# Patient Record
Sex: FEMALE | Race: WHITE | NOT HISPANIC OR LATINO | Employment: FULL TIME | URBAN - METROPOLITAN AREA
[De-identification: names, ages, dates, MRNs, and addresses within clinical notes are randomized per-mention and may not be internally consistent; named-entity substitution may affect disease eponyms.]

---

## 2023-03-08 ENCOUNTER — TELEPHONE (OUTPATIENT)
Dept: PRIMARY CARE | Facility: CLINIC | Age: 68
End: 2023-03-08
Payer: MEDICARE

## 2023-03-08 DIAGNOSIS — J01.90 ACUTE SINUSITIS, RECURRENCE NOT SPECIFIED, UNSPECIFIED LOCATION: Primary | ICD-10-CM

## 2023-03-08 DIAGNOSIS — J40 BRONCHITIS: Primary | ICD-10-CM

## 2023-03-08 RX ORDER — DOXYCYCLINE 100 MG/1
100 CAPSULE ORAL 2 TIMES DAILY
Qty: 14 CAPSULE | Refills: 0 | Status: SHIPPED | OUTPATIENT
Start: 2023-03-08 | End: 2023-03-15

## 2023-03-08 RX ORDER — FLUTICASONE PROPIONATE 220 UG/1
2 AEROSOL, METERED RESPIRATORY (INHALATION)
Qty: 12 G | Refills: 5 | Status: SHIPPED | OUTPATIENT
Start: 2023-03-08 | End: 2023-10-24 | Stop reason: ALTCHOICE

## 2023-03-08 NOTE — TELEPHONE ENCOUNTER
Nessa continues to have a productive cough with copious mucus.  She continues to feel fatigued.  Her rust colored mucus cleared up with  Azithromycin x2.  She finished her Medrol Dosepak about 10 days ago.  She is feeling improved but not completely better.  She has a sinus component to it with painful teeth and persistent nasal drainage.  She is unable to tolerate Augmentin since her bowel resection.  We will call her in doxycycline for 7 days and Flovent inhaler.  She is to continue her albuterol inhaler as needed.  I have asked her to update me next week or sooner for any concerns.

## 2023-03-08 NOTE — TELEPHONE ENCOUNTER
Patient is better, not 100% though.  She still is having trouble breathing, coughing, exhausted.    Please call [phone]

## 2023-04-12 ENCOUNTER — TELEPHONE (OUTPATIENT)
Dept: PRIMARY CARE | Facility: CLINIC | Age: 68
End: 2023-04-12
Payer: MEDICARE

## 2023-04-12 NOTE — TELEPHONE ENCOUNTER
Spoke to Nessa and try to clarify her coughing history.  After her course of pneumonia after 2 courses of azithromycin and doxycycline her cough did resolve and she was feeling better.  She then traveled to visit her daughter and came back approximately 4 days ago and started coughing again.  She has been taking only albuterol twice a day without relief.  She has no history of asthma but does have a history of needing inhalers with infections.  She seems to be getting frequent bronchitis with travel.  I have asked her to start her Flovent inhaler twice a day for 2 weeks and be sure to rinse her mouth out with water to avoid thrush.  She can still use her albuterol inhaler.  If her bronchitis persists I have asked her to come into the office for a formal discussion about further management which would most likely include repeat chest x-ray, probable repeat antibiotics and  immunoglobulin studies

## 2023-04-12 NOTE — TELEPHONE ENCOUNTER
Patient calling regarding a cough x 10 weeks overall since Pneumonia. Patient was doing a lot better and cough started back up again.    Patient is concerned would like to discuss with you.    Please call [phone]

## 2023-04-25 LAB — C. DIFFICILE TOXIN, PCR: NOT DETECTED

## 2023-05-07 DIAGNOSIS — J20.9 ACUTE BRONCHITIS, UNSPECIFIED: ICD-10-CM

## 2023-05-07 RX ORDER — ALBUTEROL SULFATE 90 UG/1
AEROSOL, METERED RESPIRATORY (INHALATION)
Qty: 6.7 G | Refills: 0 | Status: SHIPPED | OUTPATIENT
Start: 2023-05-07 | End: 2023-05-27

## 2023-05-08 PROBLEM — N95.2 ATROPHIC VAGINITIS: Status: ACTIVE | Noted: 2023-05-08

## 2023-05-08 PROBLEM — K57.30 DIVERTICULOSIS OF COLON: Status: RESOLVED | Noted: 2023-05-08 | Resolved: 2023-05-08

## 2023-05-08 PROBLEM — E55.9 VITAMIN D DEFICIENCY: Status: RESOLVED | Noted: 2023-05-08 | Resolved: 2023-05-08

## 2023-05-08 PROBLEM — G43.909 MIGRAINE HEADACHE: Status: RESOLVED | Noted: 2023-05-08 | Resolved: 2023-05-08

## 2023-05-08 PROBLEM — J30.9 ALLERGIC RHINITIS: Status: ACTIVE | Noted: 2023-05-08

## 2023-05-08 PROBLEM — M47.27 OSTEOARTHRITIS OF LUMBOSACRAL SPINE WITH RADICULOPATHY: Status: RESOLVED | Noted: 2023-05-08 | Resolved: 2023-05-08

## 2023-05-08 PROBLEM — M51.369 DEGENERATIVE DISC DISEASE, LUMBAR: Status: ACTIVE | Noted: 2023-05-08

## 2023-05-08 PROBLEM — L03.113 CELLULITIS OF RIGHT ELBOW: Status: RESOLVED | Noted: 2023-05-08 | Resolved: 2023-05-08

## 2023-05-08 PROBLEM — M81.0 OSTEOPOROSIS: Status: ACTIVE | Noted: 2023-05-08

## 2023-05-08 PROBLEM — M51.36 DEGENERATIVE DISC DISEASE, LUMBAR: Status: ACTIVE | Noted: 2023-05-08

## 2023-05-08 PROBLEM — M54.81 OCCIPITAL NEURALGIA: Status: RESOLVED | Noted: 2023-05-08 | Resolved: 2023-05-08

## 2023-05-08 PROBLEM — G44.009 CLUSTER HEADACHE: Status: ACTIVE | Noted: 2023-05-08

## 2023-05-08 PROBLEM — B02.9 HERPES ZOSTER: Status: RESOLVED | Noted: 2023-05-08 | Resolved: 2023-05-08

## 2023-05-08 PROBLEM — N81.4 CYSTOCELE WITH UTERINE PROLAPSE: Status: RESOLVED | Noted: 2023-05-08 | Resolved: 2023-05-08

## 2023-05-08 PROBLEM — I83.813 VARICOSE VEINS OF BOTH LOWER EXTREMITIES WITH PAIN: Status: RESOLVED | Noted: 2023-05-08 | Resolved: 2023-05-08

## 2023-05-08 PROBLEM — R23.3 EASY BRUISING: Status: RESOLVED | Noted: 2023-05-08 | Resolved: 2023-05-08

## 2023-05-08 PROBLEM — J45.909 REACTIVE AIRWAY DISEASE (HHS-HCC): Status: ACTIVE | Noted: 2023-05-08

## 2023-05-08 PROBLEM — L30.9 DERMATITIS: Status: RESOLVED | Noted: 2023-05-08 | Resolved: 2023-05-08

## 2023-05-08 PROBLEM — K59.09 CHRONIC CONSTIPATION: Status: RESOLVED | Noted: 2023-05-08 | Resolved: 2023-05-08

## 2023-05-08 PROBLEM — R05.3 CHRONIC COUGH: Status: ACTIVE | Noted: 2023-05-08

## 2023-05-08 PROBLEM — K58.9 IRRITABLE BOWEL SYNDROME: Status: ACTIVE | Noted: 2023-05-08

## 2023-05-08 PROBLEM — M16.11 PRIMARY OSTEOARTHRITIS OF RIGHT HIP: Status: ACTIVE | Noted: 2023-05-08

## 2023-05-08 PROBLEM — E78.5 HYPERLIPIDEMIA: Status: ACTIVE | Noted: 2023-05-08

## 2023-05-08 PROBLEM — M26.622 ARTHRALGIA OF LEFT TEMPOROMANDIBULAR JOINT: Status: ACTIVE | Noted: 2023-05-08

## 2023-05-08 PROBLEM — M26.629 TMJ ARTHRALGIA: Status: ACTIVE | Noted: 2023-05-08

## 2023-05-08 RX ORDER — VERAPAMIL HYDROCHLORIDE 120 MG/1
TABLET, FILM COATED, EXTENDED RELEASE ORAL
COMMUNITY

## 2023-05-08 RX ORDER — ALENDRONATE SODIUM 70 MG/1
70 TABLET ORAL
COMMUNITY
Start: 2022-10-21 | End: 2023-05-09 | Stop reason: ALTCHOICE

## 2023-05-08 RX ORDER — CHOLECALCIFEROL (VITAMIN D3) 50 MCG
2000 TABLET ORAL DAILY
COMMUNITY
Start: 2017-05-25

## 2023-05-08 RX ORDER — ESTRADIOL 0.1 MG/G
CREAM VAGINAL
COMMUNITY
End: 2023-10-24 | Stop reason: SDUPTHER

## 2023-05-08 RX ORDER — PEDI MULTIVIT NO.25/FOLIC ACID 300 MCG
TABLET,CHEWABLE ORAL
COMMUNITY
End: 2023-10-24 | Stop reason: ALTCHOICE

## 2023-05-08 RX ORDER — TRIAMCINOLONE ACETONIDE 1 MG/G
CREAM TOPICAL
COMMUNITY
Start: 2023-02-21

## 2023-05-08 RX ORDER — DOCUSATE SODIUM 100 MG/1
CAPSULE, LIQUID FILLED ORAL 2 TIMES DAILY
COMMUNITY
End: 2023-10-24 | Stop reason: SDUPTHER

## 2023-05-08 RX ORDER — ELECTROLYTES/DEXTROSE
1 SOLUTION, ORAL ORAL DAILY
COMMUNITY
Start: 2016-06-13

## 2023-05-08 RX ORDER — RIZATRIPTAN BENZOATE 10 MG/1
TABLET ORAL
COMMUNITY
Start: 2022-08-09 | End: 2023-10-21 | Stop reason: SDUPTHER

## 2023-05-09 ENCOUNTER — OFFICE VISIT (OUTPATIENT)
Dept: PRIMARY CARE | Facility: CLINIC | Age: 68
End: 2023-05-09
Payer: MEDICARE

## 2023-05-09 VITALS
TEMPERATURE: 97.6 F | SYSTOLIC BLOOD PRESSURE: 107 MMHG | OXYGEN SATURATION: 96 % | DIASTOLIC BLOOD PRESSURE: 60 MMHG | HEART RATE: 87 BPM

## 2023-05-09 DIAGNOSIS — R05.3 CHRONIC COUGH: Primary | ICD-10-CM

## 2023-05-09 PROCEDURE — 1159F MED LIST DOCD IN RCRD: CPT | Performed by: INTERNAL MEDICINE

## 2023-05-09 PROCEDURE — 1036F TOBACCO NON-USER: CPT | Performed by: INTERNAL MEDICINE

## 2023-05-09 PROCEDURE — 99214 OFFICE O/P EST MOD 30 MIN: CPT | Performed by: INTERNAL MEDICINE

## 2023-05-09 RX ORDER — MOXIFLOXACIN 5 MG/ML
1 SOLUTION/ DROPS OPHTHALMIC
COMMUNITY
End: 2023-10-24 | Stop reason: SDUPTHER

## 2023-05-09 RX ORDER — NEOMYCIN SULFATE, POLYMYXIN B SULFATE, HYDROCORTISONE 3.5; 10000; 1 MG/ML; [USP'U]/ML; MG/ML
SOLUTION/ DROPS AURICULAR (OTIC)
COMMUNITY
Start: 2019-05-15 | End: 2023-08-28 | Stop reason: SDUPTHER

## 2023-05-09 RX ORDER — HYDROCORTISONE 25 MG/G
CREAM TOPICAL AS NEEDED
COMMUNITY
Start: 2020-02-05

## 2023-05-09 RX ORDER — NYSTATIN 100000 U/G
OINTMENT TOPICAL
COMMUNITY
Start: 2023-01-09

## 2023-05-09 RX ORDER — ACETAMINOPHEN 325 MG/1
325 TABLET ORAL EVERY 4 HOURS PRN
COMMUNITY
Start: 2017-07-13

## 2023-05-09 RX ORDER — TOBRAMYCIN AND DEXAMETHASONE 3; 1 MG/G; MG/G
OINTMENT OPHTHALMIC
COMMUNITY
End: 2023-10-24 | Stop reason: ALTCHOICE

## 2023-05-09 RX ORDER — BUPROPION HYDROCHLORIDE 75 MG/1
TABLET ORAL
COMMUNITY
Start: 2022-08-30 | End: 2023-05-09 | Stop reason: ALTCHOICE

## 2023-05-09 NOTE — PROGRESS NOTES
"Nessa Valle is a 67 y.o. female who presents Follow-up (cough)    HPI: Nessa presents to discuss her ongoing breathing concerns since her pneumonia in February.  She is still having a chronic cough and sense of chest tightness with congestion.  She is still having some production of phlegm. Feels that it is \"hard to breath\", \"very crackly in lungs\".  She has been on multiple courses of antibiotics this winter for pneumonia and subsequent reactive airways.  She did use the Flovent and albuterol and felt that it did help.  She travels often and  is around her grandchildren who are often sick and she seems to get sick as well frequently.  She continues to be tired. She is Worried about her lungs.  She was a smoker in college.  Quit at age 23.  Mother and sister always cough.   Her sister does have a lot of rheumatologic issues and has lots of criteria for Crest and SLE    She was Having a lot of cramping and diarrhea and would get stool leakage after her multiple antibiotics which is now resolved.  Also with her excessive coughing her vaginal prolapse has worsened.  She has to use her pessary constantly now.     Not taking anything for acid reflux   Coughing worse at night.  Worse with lying down.    2018 had endoscopy.    All the reflux medicines got  her sick in the past.         Patient Active Problem List   Diagnosis    Allergic rhinitis    Atrophic vaginitis    Irritable bowel syndrome    Chronic cough    Cluster headache    Degenerative disc disease, lumbar    TMJ arthralgia    Reactive airway disease    Primary osteoarthritis of right hip    Osteoporosis    Hyperlipidemia        Past Medical History:   Diagnosis Date    Atypical chest pain     Cellulitis of right upper limb     Chronic constipation     Cystocele with uterine prolapse     Diverticulitis     Diverticulosis of colon     Foot fracture     Hematuria     Herpes zoster     History of ITP     Nonscarring hair loss, unspecified     Osteoarthritis of " lumbosacral spine with radiculopathy     SCC (squamous cell carcinoma)     Sinusitis     SVT (supraventricular tachycardia) (CMS/HCC)     Varicose veins of both lower extremities with pain     Vitamin D deficiency     Zoster         Past Surgical History:   Procedure Laterality Date    COLECTOMY PARTIAL / TOTAL  09/19/2017    Partial Colectomy Sigmoid    HAND SURGERY      HERNIA REPAIR      Inguinal hernia repair    HYSTERECTOMY      OOPHORECTOMY      SKIN CANCER EXCISION      Mohs Micrographic Surgery Arm Right    UMBILICAL HERNIA REPAIR  04/04/2014        Social History     Tobacco Use    Smoking status: Former     Types: Cigarettes    Smokeless tobacco: Never   Substance Use Topics    Alcohol use: Not Currently    Drug use: Never         Current Outpatient Medications:     acetaminophen (Tylenol) 325 mg tablet, Take 1 tablet (325 mg) by mouth every 4 hours if needed., Disp: , Rfl:     albuterol 90 mcg/actuation inhaler, INHALE 2 PUFFS EVERY 4 TO 6 HOURS AS NEEDED FOR CHEST TIGHTNESS AND COUGH, Disp: 6.7 g, Rfl: 0    biotin 5 mg capsule, Take 1 capsule (5 mg) by mouth once daily., Disp: , Rfl:     Calan  mg ER tablet, TAKE 1-2 TABLET ORALLY ONCE A DAY 90 DAYS, Disp: , Rfl:     cholecalciferol (Vitamin D-3) 50 MCG (2000 UT) tablet, Take 1 tablet (2,000 Units) by mouth once daily., Disp: , Rfl:     docusate sodium (Colace) 100 mg capsule, Take by mouth twice a day., Disp: , Rfl:     Estrace 0.01 % (0.1 mg/gram) vaginal cream, INSERT 1 GRAM INTRAVAGINALLY TWICE A WEEK, Disp: , Rfl:     fluticasone (Flovent) 220 mcg/actuation inhaler, Inhale 2 puffs  in the morning and 2 puffs before bedtime. Rinse mouth with water after use to reduce aftertaste and incidence of candidiasis. Do not swallow.., Disp: 12 g, Rfl: 5    hydrocortisone 2.5 % cream, if needed., Disp: , Rfl:     moxifloxacin (Vigamox) 0.5 % ophthalmic solution, Administer 1 drop into both eyes., Disp: , Rfl:     neomycin-polymyxin-HC (Cortisporin) otic  solution, PLACE IN EAR CANAL THREE TIMES A DAY FOR 3-4 DAYS, Disp: , Rfl:     nystatin (Mycostatin) ointment, Apply topically., Disp: , Rfl:     pedi multivit no.25-folic acid (Flintstones Multivitamin) 300 mcg tablet,chewable, Chew., Disp: , Rfl:     rizatriptan (Maxalt) 10 mg tablet, PLEASE SEE ATTACHED FOR DETAILED DIRECTIONS, Disp: , Rfl:     tobramycin-dexamethasone (Tobradex) ophthalmic ointment, TobraDex 0.3 %-0.1 % eye ointment  USE 1 APPLICATION IN BOTH EYES DAILY AT BEDTIME FOR 7 DAYS., Disp: , Rfl:     triamcinolone (Kenalog) 0.1 % cream, APPLY 2-3 TIMES DAILY TO AFFECTED AREA ON YOUR BACK, Disp: , Rfl:     inhalational spacing device inhaler, Use as directed with inhalers, Disp: 1 each, Rfl: 0     Allergies   Allergen Reactions    Banana Hives    Cantaloupe Hives    Orange Hives    Aluminum Hives    Esomeprazole Magnesium Unknown     Cluster migraines, constipation, dizziness    Iodine Other    Lactose Hives     lactose intolerant    Levofloxacin Other    Moxifloxacin Other    Sulfa (Sulfonamide Antibiotics) Other       Review of Systems  no swelling  in her legs, slightly sob with climbing stairs, does not feel she is having GERD     /60 (BP Location: Left arm, Patient Position: Sitting)   Pulse 87   Temp 36.4 °C (97.6 °F)   SpO2 96%  There is no height or weight on file to calculate BMI.   Physical Exam  Vitals reviewed.   Constitutional:       General: She is not in acute distress.     Appearance: Normal appearance. She is not ill-appearing or toxic-appearing.   HENT:      Right Ear: Tympanic membrane and external ear normal.      Left Ear: Tympanic membrane and external ear normal.      Mouth/Throat:      Mouth: Mucous membranes are moist.      Pharynx: No oropharyngeal exudate or posterior oropharyngeal erythema.   Eyes:      Conjunctiva/sclera: Conjunctivae normal.   Cardiovascular:      Rate and Rhythm: Normal rate and regular rhythm.      Heart sounds: No murmur heard.     No gallop.    Pulmonary:      Effort: Pulmonary effort is normal. No respiratory distress.      Breath sounds: Normal breath sounds. No wheezing, rhonchi or rales.   Musculoskeletal:         General: Normal range of motion.   Lymphadenopathy:      Head:      Right side of head: No submandibular adenopathy.      Left side of head: No submandibular adenopathy.      Cervical: No cervical adenopathy.   Skin:     Findings: No rash.   Neurological:      General: No focal deficit present.      Mental Status: She is alert.           Problem List Items Addressed This Visit       Chronic cough - Primary     Leading Differential includes chronic bronchitis, asthma, GERD   I do not feel she has any bacterial infection at this point and has been on multiple courses of antibiotics in the past few months.          Relevant Medications    inhalational spacing device inhaler    Other Relevant Orders    CT chest wo IV contrast    Referral to Pulmonology    Pulmonary function testing    CBC and Auto Differential    Immunoglobulins, IgG, IgA, IgM    Comprehensive metabolic panel       Nia Fletcher MD    TVT of 30 minutes with greater than 50% spent FTF in assessment/discussion and care coordination

## 2023-05-09 NOTE — PATIENT INSTRUCTIONS
Will obtain a CT scan of your lungs and pulmonary function tests   Labs ordered to check your blood counts and immune systems   Your coughing may also be related to acid reflux   Trial of cutting the Verapamil in half to see if you feel better.  More energy, less reflux   Re-challenge yourself with Pepcid    Return in 4 to 6 weeks

## 2023-05-10 NOTE — ASSESSMENT & PLAN NOTE
Leading Differential includes chronic bronchitis, asthma, GERD   I do not feel she has any bacterial infection at this point and has been on multiple courses of antibiotics in the past few months.

## 2023-05-27 DIAGNOSIS — J20.9 ACUTE BRONCHITIS, UNSPECIFIED: ICD-10-CM

## 2023-05-27 RX ORDER — ALBUTEROL SULFATE 90 UG/1
AEROSOL, METERED RESPIRATORY (INHALATION)
Qty: 8 G | Refills: 3 | Status: SHIPPED | OUTPATIENT
Start: 2023-05-27 | End: 2023-10-24 | Stop reason: ALTCHOICE

## 2023-05-30 NOTE — PROGRESS NOTES
Discussed results with Nessa.  Will repeat chest CT in one year. Counseled her that I  not feel her CT explains her symptoms   She does have a pulmonary appointment with Dr. Shen tomorrow and PFTs scheduled in the future.

## 2023-06-02 LAB
BASOPHILS (10*3/UL) IN BLOOD BY AUTOMATED COUNT: 0.05 X10E9/L (ref 0–0.1)
BASOPHILS/100 LEUKOCYTES IN BLOOD BY AUTOMATED COUNT: 0.9 % (ref 0–2)
EOSINOPHILS (10*3/UL) IN BLOOD BY AUTOMATED COUNT: 0.18 X10E9/L (ref 0–0.7)
EOSINOPHILS/100 LEUKOCYTES IN BLOOD BY AUTOMATED COUNT: 3.2 % (ref 0–6)
ERYTHROCYTE DISTRIBUTION WIDTH (RATIO) BY AUTOMATED COUNT: 12.3 % (ref 11.5–14.5)
ERYTHROCYTE MEAN CORPUSCULAR HEMOGLOBIN CONCENTRATION (G/DL) BY AUTOMATED: 31.6 G/DL (ref 32–36)
ERYTHROCYTE MEAN CORPUSCULAR VOLUME (FL) BY AUTOMATED COUNT: 94 FL (ref 80–100)
ERYTHROCYTES (10*6/UL) IN BLOOD BY AUTOMATED COUNT: 4.43 X10E12/L (ref 4–5.2)
HEMATOCRIT (%) IN BLOOD BY AUTOMATED COUNT: 41.5 % (ref 36–46)
HEMOGLOBIN (G/DL) IN BLOOD: 13.1 G/DL (ref 12–16)
IGA (MG/DL) IN SER/PLAS: 187 MG/DL (ref 70–400)
IGG (MG/DL) IN SER/PLAS: 1090 MG/DL (ref 700–1600)
IGM (MG/DL) IN SER/PLAS: 75 MG/DL (ref 40–230)
IMMATURE GRANULOCYTES/100 LEUKOCYTES IN BLOOD BY AUTOMATED COUNT: 0 % (ref 0–0.9)
LEUKOCYTES (10*3/UL) IN BLOOD BY AUTOMATED COUNT: 5.6 X10E9/L (ref 4.4–11.3)
LYMPHOCYTES (10*3/UL) IN BLOOD BY AUTOMATED COUNT: 2.05 X10E9/L (ref 1.2–4.8)
LYMPHOCYTES/100 LEUKOCYTES IN BLOOD BY AUTOMATED COUNT: 36.7 % (ref 13–44)
MONOCYTES (10*3/UL) IN BLOOD BY AUTOMATED COUNT: 0.39 X10E9/L (ref 0.1–1)
MONOCYTES/100 LEUKOCYTES IN BLOOD BY AUTOMATED COUNT: 7 % (ref 2–10)
NEUTROPHILS (10*3/UL) IN BLOOD BY AUTOMATED COUNT: 2.91 X10E9/L (ref 1.2–7.7)
NEUTROPHILS/100 LEUKOCYTES IN BLOOD BY AUTOMATED COUNT: 52.2 % (ref 40–80)
NRBC (PER 100 WBCS) BY AUTOMATED COUNT: 0 /100 WBC (ref 0–0)
PLATELETS (10*3/UL) IN BLOOD AUTOMATED COUNT: 239 X10E9/L (ref 150–450)

## 2023-06-04 LAB
ALLERGEN ANIMAL: CAT DANDER IGE (KU/L): <0.1 KU/L
ALLERGEN ANIMAL: DOG DANDER IGE (KU/L): <0.1 KU/L
ALLERGEN GRASS: BERMUDA GRASS (CYNODON DACTYLON) IGE (KU/L): <0.1 KU/L
ALLERGEN GRASS: JOHNSON GRASS (SORGHUM HALEPENSE) IGE (KU/L): <0.1 KU/L
ALLERGEN GRASS: MEADOW GRASS, KENTUCKY BLUE (POA PRATENSIS )IGE (KU/L): 0.85 KU/L
ALLERGEN GRASS: TIMOTHY GRASS (PHLEUM PRATENSE) IGE (KU/L): 0.64 KU/L
ALLERGEN INSECT: COCKROACH IGE: <0.1 KU/L
ALLERGEN MICROORGANISM: ALTERNARIA ALTERNATA IGE (KU/L): <0.1 KU/L
ALLERGEN MICROORGANISM: ASPERGILLUS FUMIGATUS IGE (KU/L): <0.1 KU/L
ALLERGEN MICROORGANISM: CLADOSPORIUM HERBARUM IGE (KU/L): <0.1 KU/L
ALLERGEN MICROORGANISM: PENICILLIUM CHRYSOGENUM (P. NOTATUM) IGE (KU/L): <0.1 KU/L
ALLERGEN MITE: DERMATOPHAGOIDES FARINAE (HOUSE DUST MITE) IGE (KU/L): <0.1 KU/L
ALLERGEN MITE: DERMATOPHAGOIDES PTERONYSSINUS (HOUSE DUST MITE) IGE (KU/L): <0.1 KU/L
ALLERGEN TREE: BOX-ELDER (ACER NEGUNDO) IGE (KU/L): <0.1 KU/L
ALLERGEN TREE: COMMON SILVER BIRCH (BETULA VERRUCOSA) IGE (KU/L): <0.1 KU/L
ALLERGEN TREE: COTTONWOOD (POPULUS DELTOIDES) IGE (KU/L): <0.1 KU/L
ALLERGEN TREE: ELM (ULMUS AMERICANA) IGE (KU/L): <0.1 KU/L
ALLERGEN TREE: MAPLE LEAF SYCAMORE, LONDON PLANE IGE (KU/L): <0.1 KU/L
ALLERGEN TREE: MOUNTAIN JUNIPER (JUNIPERUS SABINOIDES) IGE (KU/L): <0.1 KU/L
ALLERGEN TREE: MULBERRY (MORUS ALBA) IGE (KU/L): <0.1 KU/L
ALLERGEN TREE: OAK (QUERCUS ALBA) IGE (KU/L): <0.1 KU/L
ALLERGEN TREE: PECAN, HICKORY (CARYA PECAN) IGE (KU/L): 0.12 KU/L
ALLERGEN TREE: WALNUT IGE: 0.11 KU/L
ALLERGEN TREE: WHITE ASH (FRAXINUS AMERICANA) IGE (KU/L): <0.1 KU/L
ALLERGEN WEED: COMMON PIGWEED (AMARANTHUS RETROFLEXUS) IGE (KU/L): <0.1 KU/L
ALLERGEN WEED: COMMON RAGWEED (AMB. ARTEMISIIFOLIA/A. ELATIOR) IGE (KU/L): <0.1 KU/L
ALLERGEN WEED: GOOSEFOOT, LAMB'S QUARTERS (CHENOPODIUM ALBUM) IGE (KU/L): <0.1 KU/L
ALLERGEN WEED: PLANTAIN (ENGLISH), RIBWORT (PLANTAGO LANCEOLATA) IGE (KU/L): <0.1 KU/L
ALLERGEN WEED: PRICKLY SALTWORT/RUSSIAN THISTLE (SALSOLA KALI) IGE (KU/L): <0.1 KU/L
ALLERGEN WEED: SHEEP SORREL (RUMEX ACETOSELLA) IGE (KU/L): <0.1 KU/L
IMMUNOCAP IGE: 6.7 KU/L (ref 0–214)
IMMUNOCAP INTERPRETATION: ABNORMAL

## 2023-07-07 ENCOUNTER — LAB (OUTPATIENT)
Dept: LAB | Facility: LAB | Age: 68
End: 2023-07-07
Payer: MEDICARE

## 2023-07-07 DIAGNOSIS — R35.0 URINARY FREQUENCY: ICD-10-CM

## 2023-07-07 LAB
APPEARANCE, URINE: CLEAR
BACTERIA, URINE: ABNORMAL /HPF
BILIRUBIN, URINE: NEGATIVE
BLOOD, URINE: NEGATIVE
COLOR, URINE: ABNORMAL
GLUCOSE, URINE: NEGATIVE MG/DL
KETONES, URINE: NEGATIVE MG/DL
LEUKOCYTE ESTERASE, URINE: ABNORMAL
NITRITE, URINE: NEGATIVE
PH, URINE: 5 (ref 5–8)
PROTEIN, URINE: NEGATIVE MG/DL
RBC, URINE: 1 /HPF (ref 0–5)
SPECIFIC GRAVITY, URINE: 1 (ref 1–1.03)
SQUAMOUS EPITHELIAL CELLS, URINE: 1 /HPF
UROBILINOGEN, URINE: <2 MG/DL (ref 0–1.9)
WBC, URINE: 11 /HPF (ref 0–5)

## 2023-07-07 PROCEDURE — 87086 URINE CULTURE/COLONY COUNT: CPT

## 2023-07-07 PROCEDURE — 87186 SC STD MICRODIL/AGAR DIL: CPT

## 2023-07-07 PROCEDURE — 81001 URINALYSIS AUTO W/SCOPE: CPT

## 2023-07-07 PROCEDURE — 87077 CULTURE AEROBIC IDENTIFY: CPT

## 2023-07-08 ENCOUNTER — TELEPHONE (OUTPATIENT)
Dept: PRIMARY CARE | Facility: CLINIC | Age: 68
End: 2023-07-08
Payer: MEDICARE

## 2023-07-08 DIAGNOSIS — R39.9 URINARY TRACT INFECTION SYMPTOMS: Primary | ICD-10-CM

## 2023-07-08 RX ORDER — CEPHALEXIN 500 MG/1
500 CAPSULE ORAL 3 TIMES DAILY
Qty: 21 CAPSULE | Refills: 0 | Status: SHIPPED | OUTPATIENT
Start: 2023-07-08 | End: 2023-07-15

## 2023-07-08 NOTE — TELEPHONE ENCOUNTER
Called today after seeing her urinalysis. She reports that she is symptomatic with urinary frequency and dysuria.  She would like to start an antibiotic empirically while her culture is pending. I did  her that it is not clear from the urinalysis is her symptoms are from infection or other issue at this point and the culture will help clarify.

## 2023-07-09 LAB — URINE CULTURE: ABNORMAL

## 2023-07-17 ENCOUNTER — TELEPHONE (OUTPATIENT)
Dept: PRIMARY CARE | Facility: CLINIC | Age: 68
End: 2023-07-17
Payer: MEDICARE

## 2023-07-17 DIAGNOSIS — R39.9 URINARY TRACT INFECTION SYMPTOMS: Primary | ICD-10-CM

## 2023-07-17 NOTE — TELEPHONE ENCOUNTER
Medication: lantus    PCP: Harriet Selby MD  Preferred Contact Number:     mobile [phone]         Pharmacy:  n/a    Patient would like a call back.     Patient complete Keflex for UTI, still has pressure, difficulty urinating, slight pain.  She would like to know does she need to give another urine sample or what are your recommendations.    Please call [phone]

## 2023-07-18 ENCOUNTER — LAB (OUTPATIENT)
Dept: LAB | Facility: LAB | Age: 68
End: 2023-07-18
Payer: MEDICARE

## 2023-07-18 DIAGNOSIS — R39.9 URINARY TRACT INFECTION SYMPTOMS: ICD-10-CM

## 2023-07-18 LAB
APPEARANCE, URINE: CLEAR
BILIRUBIN, URINE: NEGATIVE
BLOOD, URINE: NEGATIVE
COLOR, URINE: NORMAL
GLUCOSE, URINE: NEGATIVE MG/DL
KETONES, URINE: NEGATIVE MG/DL
LEUKOCYTE ESTERASE, URINE: NEGATIVE
NITRITE, URINE: NEGATIVE
PH, URINE: 5 (ref 5–8)
PROTEIN, URINE: NEGATIVE MG/DL
SPECIFIC GRAVITY, URINE: 1.01 (ref 1–1.03)
UROBILINOGEN, URINE: <2 MG/DL (ref 0–1.9)

## 2023-07-18 PROCEDURE — 81003 URINALYSIS AUTO W/O SCOPE: CPT

## 2023-08-02 ENCOUNTER — TELEPHONE (OUTPATIENT)
Dept: PRIMARY CARE | Facility: CLINIC | Age: 68
End: 2023-08-02
Payer: MEDICARE

## 2023-08-02 DIAGNOSIS — R10.2 SUPRAPUBIC PAIN: Primary | ICD-10-CM

## 2023-08-02 NOTE — TELEPHONE ENCOUNTER
Still feeling a pressure worse at night.  Has some dysuria.    Having a hard time urinating.  She has had these episodes before.   She does feel her bladder has dropped. She has a constant pain.   She does have a rectocele and a pessary which has helped before.  We will recheck a urinalysis and urine culture first. If negative will refer on to Dr. Villatoro for further evaluation and management

## 2023-08-08 ENCOUNTER — LAB (OUTPATIENT)
Dept: LAB | Facility: LAB | Age: 68
End: 2023-08-08
Payer: MEDICARE

## 2023-08-08 DIAGNOSIS — R10.2 SUPRAPUBIC PAIN: ICD-10-CM

## 2023-08-08 LAB
APPEARANCE, URINE: CLEAR
BILIRUBIN, URINE: NEGATIVE
BLOOD, URINE: NEGATIVE
COLOR, URINE: YELLOW
GLUCOSE, URINE: NEGATIVE MG/DL
KETONES, URINE: NEGATIVE MG/DL
LEUKOCYTE ESTERASE, URINE: NEGATIVE
NITRITE, URINE: NEGATIVE
PH, URINE: 5 (ref 5–8)
PROTEIN, URINE: NEGATIVE MG/DL
SPECIFIC GRAVITY, URINE: 1.01 (ref 1–1.03)
UROBILINOGEN, URINE: <2 MG/DL (ref 0–1.9)

## 2023-08-08 PROCEDURE — 87086 URINE CULTURE/COLONY COUNT: CPT

## 2023-08-08 PROCEDURE — 81003 URINALYSIS AUTO W/O SCOPE: CPT

## 2023-08-09 DIAGNOSIS — N81.4 CYSTOCELE WITH PROLAPSE: Primary | ICD-10-CM

## 2023-08-09 LAB — URINE CULTURE: NORMAL

## 2023-08-28 DIAGNOSIS — H60.90 OTITIS EXTERNA, UNSPECIFIED CHRONICITY, UNSPECIFIED LATERALITY, UNSPECIFIED TYPE: Primary | ICD-10-CM

## 2023-08-28 RX ORDER — NEOMYCIN SULFATE, POLYMYXIN B SULFATE, HYDROCORTISONE 3.5; 10000; 1 MG/ML; [USP'U]/ML; MG/ML
3 SOLUTION/ DROPS AURICULAR (OTIC) 3 TIMES DAILY
Qty: 10 ML | Refills: 0 | Status: SHIPPED | OUTPATIENT
Start: 2023-08-28

## 2023-08-28 NOTE — TELEPHONE ENCOUNTER
Patient is going on a trip that will require lots of water.  She had an old ear drops medication from previous doctor.  She wants to know if you can refill to take with her?    Please advise  I can call back  [phone]    MED- neomycin/polymycin optic solution

## 2023-09-27 DIAGNOSIS — Z00.00 HEALTH MAINTENANCE EXAMINATION: ICD-10-CM

## 2023-10-21 PROBLEM — R30.0 DYSURIA: Status: ACTIVE | Noted: 2023-10-21

## 2023-10-21 PROBLEM — B99.9 RECURRENT INFECTIONS: Status: ACTIVE | Noted: 2023-10-21

## 2023-10-21 PROBLEM — M25.871 IMPINGEMENT SYNDROME OF RIGHT ANKLE: Status: ACTIVE | Noted: 2020-02-26

## 2023-10-21 PROBLEM — K21.00 GASTROESOPHAGEAL REFLUX DISEASE WITH ESOPHAGITIS: Status: ACTIVE | Noted: 2018-02-07

## 2023-10-21 PROBLEM — D37.032 NEOPLASM OF UNCERTAIN BEHAVIOR OF SUBMANDIBULAR GLAND: Status: ACTIVE | Noted: 2022-10-06

## 2023-10-21 PROBLEM — K11.5 SUBMANDIBULAR SIALOLITHIASIS: Status: ACTIVE | Noted: 2022-10-06

## 2023-10-21 PROBLEM — R53.83 MALAISE AND FATIGUE: Status: ACTIVE | Noted: 2019-02-01

## 2023-10-21 PROBLEM — M89.9 DISORDER OF BONE AND ARTICULAR CARTILAGE: Status: ACTIVE | Noted: 2019-02-01

## 2023-10-21 PROBLEM — M85.80 OSTEOPENIA: Status: ACTIVE | Noted: 2023-10-21

## 2023-10-21 PROBLEM — R10.32 CHRONIC LEFT LOWER QUADRANT PAIN: Status: ACTIVE | Noted: 2023-10-21

## 2023-10-21 PROBLEM — J31.2 CHRONIC SORE THROAT: Status: ACTIVE | Noted: 2023-10-21

## 2023-10-21 PROBLEM — R20.9 DISTURBANCE OF SKIN SENSATION: Status: ACTIVE | Noted: 2023-10-21

## 2023-10-21 PROBLEM — M62.89 PELVIC FLOOR DYSFUNCTION: Status: ACTIVE | Noted: 2023-10-21

## 2023-10-21 PROBLEM — M94.9 DISORDER OF BONE AND ARTICULAR CARTILAGE: Status: ACTIVE | Noted: 2019-02-01

## 2023-10-21 PROBLEM — K57.32 DIVERTICULITIS OF LARGE INTESTINE WITHOUT PERFORATION OR ABSCESS WITHOUT BLEEDING: Status: ACTIVE | Noted: 2017-05-11

## 2023-10-21 PROBLEM — G89.29 CHRONIC MIDLINE LOW BACK PAIN WITHOUT SCIATICA: Status: ACTIVE | Noted: 2018-10-29

## 2023-10-21 PROBLEM — G89.29 CHRONIC PAIN OF RIGHT ANKLE: Status: ACTIVE | Noted: 2019-09-25

## 2023-10-21 PROBLEM — R00.0 TACHYCARDIA: Status: ACTIVE | Noted: 2023-10-21

## 2023-10-21 PROBLEM — M85.89 OSTEOPENIA OF MULTIPLE SITES: Status: ACTIVE | Noted: 2018-10-29

## 2023-10-21 PROBLEM — K62.3 RECTAL PROLAPSE: Status: ACTIVE | Noted: 2023-10-21

## 2023-10-21 PROBLEM — H69.92 DYSFUNCTION OF LEFT EUSTACHIAN TUBE: Status: ACTIVE | Noted: 2023-10-21

## 2023-10-21 PROBLEM — R35.0 URINARY FREQUENCY: Status: ACTIVE | Noted: 2023-10-21

## 2023-10-21 PROBLEM — D22.9 NUMEROUS MOLES: Status: ACTIVE | Noted: 2023-10-21

## 2023-10-21 PROBLEM — G89.29 CHRONIC LEFT LOWER QUADRANT PAIN: Status: ACTIVE | Noted: 2023-10-21

## 2023-10-21 PROBLEM — M25.571 CHRONIC PAIN OF RIGHT ANKLE: Status: ACTIVE | Noted: 2019-09-25

## 2023-10-21 PROBLEM — K59.00 OBSTIPATION: Status: ACTIVE | Noted: 2018-02-07

## 2023-10-21 PROBLEM — M54.50 CHRONIC MIDLINE LOW BACK PAIN WITHOUT SCIATICA: Status: ACTIVE | Noted: 2018-10-29

## 2023-10-21 PROBLEM — M51.16 INTERVERTEBRAL DISC DISORDER WITH RADICULOPATHY OF LUMBAR REGION: Status: ACTIVE | Noted: 2020-09-18

## 2023-10-21 PROBLEM — K59.09 CHRONIC CONSTIPATION: Status: ACTIVE | Noted: 2023-05-08

## 2023-10-21 PROBLEM — R39.11 URINARY HESITANCY: Status: ACTIVE | Noted: 2023-10-21

## 2023-10-21 PROBLEM — R53.81 MALAISE AND FATIGUE: Status: ACTIVE | Noted: 2019-02-01

## 2023-10-21 PROBLEM — M21.6X1: Status: ACTIVE | Noted: 2020-02-26

## 2023-10-21 PROBLEM — I10 ESSENTIAL HYPERTENSION: Status: ACTIVE | Noted: 2019-02-01

## 2023-10-21 PROBLEM — I47.10 PAROXYSMAL SVT (SUPRAVENTRICULAR TACHYCARDIA) (CMS-HCC): Status: ACTIVE | Noted: 2017-06-28

## 2023-10-21 RX ORDER — DOCUSATE SODIUM 100 MG/1
100 CAPSULE, LIQUID FILLED ORAL
COMMUNITY
End: 2023-10-24 | Stop reason: SDUPTHER

## 2023-10-21 RX ORDER — HYDROCODONE BITARTRATE AND HOMATROPINE METHYLBROMIDE ORAL SOLUTION 5; 1.5 MG/5ML; MG/5ML
5-10 LIQUID ORAL NIGHTLY PRN
COMMUNITY
Start: 2023-02-06 | End: 2024-03-27 | Stop reason: ALTCHOICE

## 2023-10-21 RX ORDER — BENZONATATE 100 MG/1
100 CAPSULE ORAL 3 TIMES DAILY PRN
COMMUNITY
Start: 2023-02-06 | End: 2023-12-07

## 2023-10-21 RX ORDER — ALENDRONATE SODIUM 70 MG/1
70 TABLET ORAL
COMMUNITY
Start: 2022-10-21 | End: 2023-10-24

## 2023-10-21 RX ORDER — VALACYCLOVIR HYDROCHLORIDE 1 G/1
1 TABLET, FILM COATED ORAL 3 TIMES DAILY
COMMUNITY

## 2023-10-21 RX ORDER — RIZATRIPTAN BENZOATE 10 MG/1
10 TABLET ORAL ONCE AS NEEDED
COMMUNITY
Start: 2014-03-03

## 2023-10-21 RX ORDER — BUPROPION HYDROCHLORIDE 75 MG/1
1 TABLET ORAL 2 TIMES DAILY
COMMUNITY
End: 2023-10-24

## 2023-10-21 RX ORDER — DOCUSATE SODIUM 100 MG/1
1 CAPSULE, LIQUID FILLED ORAL 2 TIMES DAILY PRN
COMMUNITY

## 2023-10-21 RX ORDER — BUPROPION HYDROCHLORIDE 75 MG/1
1 TABLET ORAL 2 TIMES DAILY
COMMUNITY
Start: 2022-08-30 | End: 2023-10-24

## 2023-10-21 RX ORDER — MOXIFLOXACIN 5 MG/ML
1 SOLUTION/ DROPS OPHTHALMIC 3 TIMES DAILY
COMMUNITY
Start: 2021-01-24 | End: 2023-10-24 | Stop reason: ALTCHOICE

## 2023-10-21 RX ORDER — TRIPROLIDINE/PSEUDOEPHEDRINE 2.5MG-60MG
TABLET ORAL
COMMUNITY
End: 2024-03-27 | Stop reason: ALTCHOICE

## 2023-10-21 RX ORDER — ESTRADIOL 0.1 MG/G
2 CREAM VAGINAL
COMMUNITY
Start: 2012-10-05

## 2023-10-22 NOTE — PROGRESS NOTES
Physical Exam    Name Nessa Valle    Date of Service :10/24/2023    Clarify osteoporosis history -  any treatment.   Dr. Mine Forte.    She has met with Dr. Dai in the past .    Clarify allergy and coughing history.  Lots of environmental allergies, She still coughs   She smoked for 6 years.  Quit age 22   Reconcile medications      Nessa Valle is a 68 y.o. year old female who is being seen for a comprehensive exam  She feels well overall.  She has recovered from her last prolonged viral illness she had over the late spring early summer.    She does report sounds with her breathing like as crinkling.  She also has a chronic cough and does feel some chest tightness. She has had a negative PFTs without bronchodilator response.   This past year She has had multiple URIs especially after travelling to visit with her grandchildren.  She has consulted with Dr. Shen in Pulmonary who checked PFTs and immunoglobulins both which were normal. Her respiratory allergy panel was positive for grass.   She does have various environmental allergies including dust and mold.     She also established with Danita Haney last month for her uterine prolapse, cystocele, pelvic floor dysfunction as well as bowel straining and leakage. She was referred to pelvic floor therapy, TV estrogen, and continued use of Pessary.  Instructed to follow up in 3 months in December   GYN Surgical Hx:  - Hx of Hysterectomy, 1997 and prolapse repair [facility] w Dr. Driver  - Hx of Oophorectomy, 2002 ovary removal, Fallopian tube adhesions over bladder, bowel and endometriosis  w Dr. 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 Since then she has had problems with erratic bowel movements and recurrent episodes of lower abdominal pain..She will take Colace 2 to 3 after she gets home from work and  then eat dinner to stay regular. She will do this 3 to 4 days a week. She also had an episode of bacterial overgrowth in 2019. Most recent CT of abdomen and pelvis done August 29, 2022 was without any acute abnormalities. She has some narrowing at the anastomosis.      She also has cluster headaches and optical migraines. She has been on Calan SR for many years. She has tried to get off but her HAs come back full force. Botox helps and Maxalt as needed   Follows with Neurology at [facility].     Lesion of left lower eyelid recc. To be excised by ophthalmologist .  Her dermatologist felt it was not worrisome. She would like to get a second opinion before undergoing a biopsy.   She does have a history of SCC and BCC        Patient Active Problem List   Diagnosis    Allergic rhinitis    Postmenopausal atrophic vaginitis    Chronic constipation    Irritable bowel syndrome    Chronic cough    Cluster headache    Cystocele, midline    Degenerative disc disease, lumbar    TMJ dysfunction    Reactive airway disease    Primary osteoarthritis of right hip    Osteoporosis    Migraine with aura    Hyperlipidemia    Age-related osteoporosis without current pathological fracture    Ataxia    Bilateral fibrocystic breast changes    Breast cyst    Chronic left lower quadrant pain    Chronic midline low back pain without sciatica    Chronic pain of right ankle    Chronic sore throat    Disorder of bone and articular cartilage    Disturbance of skin sensation    Diverticulitis of large intestine without perforation or abscess without bleeding    Dysfunction of left eustachian tube    Dysuria    Essential hypertension    Female stress incontinence    Heterozygous MTHFR mutation [id number]    Impingement syndrome of right ankle    Inconclusive mammogram    Lactose intolerance    Malaise and fatigue    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    Numerous moles    Osteopenia of multiple sites    Osteopenia    Paroxysmal SVT  (supraventricular tachycardia)    Prolapse of vaginal vault after hysterectomy    Pronation deformity of ankle, acquired, right    Submandibular sialolithiasis    Tachycardia    Urinary frequency    Urinary hesitancy    Recurrent infections    Obstipation    Pelvic floor dysfunction    Rectal prolapse    Rectocele    Vertigo of central origin    Intervertebral disc disorder with radiculopathy of lumbar region    Gastroesophageal reflux disease with esophagitis    Annual physical exam    Multiple pulmonary nodules        Past Medical History:   Diagnosis Date    Atypical chest pain     Cellulitis of right upper limb     Chronic constipation     Cystocele with uterine prolapse     Diverticulitis     Diverticulosis of colon     Foot fracture     Hematuria     Herpes zoster     History of ITP     Nonscarring hair loss, unspecified     Osteoarthritis of lumbosacral spine with radiculopathy     SCC (squamous cell carcinoma)     Sinusitis     SVT (supraventricular tachycardia)     Varicose veins of both lower extremities with pain     Vitamin D deficiency     Zoster         Past Surgical History:   Procedure Laterality Date    COLECTOMY PARTIAL / TOTAL  09/19/2017    Partial Colectomy Sigmoid    HAND SURGERY      HERNIA REPAIR      Inguinal hernia repair    HYSTERECTOMY      OOPHORECTOMY      SKIN CANCER EXCISION      Mohs Micrographic Surgery Arm Right    UMBILICAL HERNIA REPAIR  04/04/2014        Social History     Tobacco Use    Smoking status: Former     Packs/day: 0.75     Years: 4.00     Additional pack years: 0.00     Total pack years: 3.00     Types: Cigarettes    Smokeless tobacco: Never   Substance Use Topics    Alcohol use: Not Currently    Drug use: Never      Social History     Social History Narrative     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 2 daughters Mariola  who is an artist and influencer, Huma is an actor and lives in [place]. works for [employer]. Mariola lives in [place].    four  grandsons.     Her diet: generally healthy    Tobacco-from age [age]yo 3/4 ppd     ETOH- NO    Exercise- nothing regular because of her pelvic prolapse and LLQ pain at site of anastomosis     Inconsistent walking due to chronic foot pain       Family History   Problem Relation Name Age of Onset    Ovarian cancer Mother          d.93    Osteoporosis Mother      Glaucoma Mother      Macular degeneration Mother      Hypertension Father          d. 58 of an accident    Other (CREST) Sister      Diabetes Sister      No Known Problems Brother      Stomach cancer Maternal Grandmother          d. 72    Stroke Maternal Grandfather          d. 72    COPD Maternal Grandfather      Heart failure Paternal Grandmother          d. 82    Diabetes Paternal Grandfather          d.64    Other (CHRONIC CONSTIPATION) Other FAMILY           Current Outpatient Medications:     acetaminophen (Tylenol) 325 mg tablet, Take 1 tablet (325 mg) by mouth every 4 hours if needed., Disp: , Rfl:     benzonatate (Tessalon) 100 mg capsule, Take 1 capsule (100 mg) by mouth 3 times a day as needed for cough., Disp: , Rfl:     biotin 5 mg capsule, Take 1 capsule (5 mg) by mouth once daily., Disp: , Rfl:     Calan  mg ER tablet, TAKE 1-2 TABLET ORALLY ONCE A DAY 90 DAYS, Disp: , Rfl:     cholecalciferol (Vitamin D-3) 50 MCG (2000 UT) tablet, Take 1 tablet (2,000 Units) by mouth once daily., Disp: , Rfl:     docusate sodium (Colace) 100 mg capsule, Take 1 capsule (100 mg) by mouth 2 times a day as needed for constipation., Disp: , Rfl:     estradiol (Estrace) 0.01 % (0.1 mg/gram) vaginal cream, Insert 2 g into the vagina 1 (one) time per week., Disp: , Rfl:     hydrocortisone 2.5 % cream, if needed., Disp: , Rfl:     ibuprofen 100 mg/5 mL suspension, Take by mouth., Disp: , Rfl:     inhalational spacing device inhaler, Use as directed with inhalers, Disp: 1 each, Rfl: 0    neomycin-polymyxin-HC (Cortisporin) otic solution, Administer 3 drops into  "each ear 3 times a day., Disp: 10 mL, Rfl: 0    nystatin (Mycostatin) ointment, Apply topically., Disp: , Rfl:     rizatriptan (Maxalt) 10 mg tablet, Take 1 tablet (10 mg) by mouth 1 time if needed for migraine. take 1 tablet by mouth AT ONSET OF HEADACHE may repeat in 2 hours IF headache PERSISTS maximum daily dose of 3, Disp: , Rfl:     triamcinolone (Kenalog) 0.1 % cream, APPLY 2-3 TIMES DAILY TO AFFECTED AREA ON YOUR BACK, Disp: , Rfl:     valACYclovir (Valtrex) 1 gram tablet, Take 1 tablet (1,000 mg) by mouth 3 times a day., Disp: , Rfl:     albuterol 90 mcg/actuation inhaler, Inhale 2 puffs every 6 hours if needed for wheezing or shortness of breath., Disp: 8 g, Rfl: 3    fluticasone (Flovent) 220 mcg/actuation inhaler, Inhale 2 puffs 2 times a day. Rinse mouth with water after use to reduce aftertaste and incidence of candidiasis. Do not swallow., Disp: 12 g, Rfl: 5    hydrocodone-homatropine 5-1.5 mg/5 mL syrup, Take 5-10 mL by mouth as needed at bedtime for cough., Disp: , Rfl:     Allergies   Allergen Reactions    Iodine Other, Anaphylaxis and Hives     Shrimp causes hives, facial swelling    Sulfa (Sulfonamide Antibiotics) Other, Hives, Rash and Swelling     hives, headache   swelling hands, face     hives, headache swelling hands, face    Banana Hives    Cantaloupe Hives    Orange Hives    Strawberry Hives    Aluminum Hives    Esomeprazole Other     Cluster migraines, constipation, dizziness    Esomeprazole Magnesium Unknown     Cluster migraines, constipation, dizziness    Lactose Hives     lactose intolerant    Levofloxacin Other and Unknown     Leg pain, numbness, tingling    Moxifloxacin Other and Hives     Leg pain, numbness, tingling    Ofloxacin Unknown    Shrimp Unknown    Tomato Other     sores in mouth       Review of Systems     /79 (BP Location: Left arm, Patient Position: Sitting)   Pulse 85   Temp 36.5 °C (97.7 °F)   Ht 1.626 m (5' 4\")   Wt 63.7 kg (140 lb 8 oz)   SpO2 95%   BMI " 24.12 kg/m²  Body mass index is 24.12 kg/m².    Physical Exam  Vitals reviewed.   Constitutional:       General: She is not in acute distress.     Appearance: Normal appearance.   HENT:      Right Ear: Tympanic membrane and external ear normal.      Left Ear: Tympanic membrane and external ear normal.      Mouth/Throat:      Mouth: Mucous membranes are moist.      Pharynx: No oropharyngeal exudate or posterior oropharyngeal erythema.   Eyes:      Extraocular Movements: Extraocular movements intact.      Pupils: Pupils are equal, round, and reactive to light.   Neck:      Vascular: No carotid bruit.   Cardiovascular:      Rate and Rhythm: Normal rate and regular rhythm.      Pulses: Normal pulses.      Heart sounds: Normal heart sounds. No murmur heard.     No gallop.   Pulmonary:      Effort: Pulmonary effort is normal. No respiratory distress.      Breath sounds: Wheezing (right upper lobe) present. No rhonchi or rales.   Abdominal:      General: Bowel sounds are normal. There is no distension.      Palpations: There is no mass.      Tenderness: There is no abdominal tenderness. There is no guarding.   Musculoskeletal:         General: No swelling or tenderness. Normal range of motion.      Right lower leg: No edema.      Left lower leg: No edema.   Lymphadenopathy:      Cervical: No cervical adenopathy.      Upper Body:      Right upper body: No supraclavicular or axillary adenopathy.      Left upper body: No supraclavicular or axillary adenopathy.      Lower Body: No right inguinal adenopathy. No left inguinal adenopathy.   Skin:     General: Skin is warm and dry.      Findings: No rash.   Neurological:      General: No focal deficit present.      Mental Status: She is alert.   Psychiatric:         Mood and Affect: Mood normal.         RESULTS/DATA:  Reviewed Standard Labs for this physical with patient ( any significant issues addressed in A/P )       Assessment/Plan   She will obtain her  fasting labs.    I  have ordered a  short term 6 month chest CT  to follow up for multiple pulmonary nodules and wheeze on exam with a history of coughing as well.  I would like her to Restart the flovent inhaler twice a day for 2 weeks and use her albuterol inhaler for immediate bronchodilator relive for a couple of weeks  and report back to me any improvement in her symptoms.  Her cough may also be related to her acid reflux. She is intolerant to PPIs so I would like her to Try Pepcid AC twice a day    She will Discuss treatment for her osteoporosis with her upcoming rheumatologist appointment. Currently she is not on any treatment because she did not want to start the Fosamax prescribed  She will use tylenol and Voltaren gel for her hand arthritis.      Problem List Items Addressed This Visit       Hyperlipidemia    Annual physical exam - Primary    Multiple pulmonary nodules    Relevant Orders    CT chest wo IV contrast     Other Visit Diagnoses       Bronchitis        Relevant Medications    fluticasone (Flovent) 220 mcg/actuation inhaler    albuterol 90 mcg/actuation inhaler    Acute bronchitis, unspecified        Relevant Medications    albuterol 90 mcg/actuation inhaler            ROUTINE:     Immunizations  current.      Mammogram: last completed 8/30/22  neg.  Follows with Freda Almendarez CNP last visit 8/14/23 mammogram done at that visit.  PAP: no longer indicated s/p hysterectomy/oopherectomy  COLONOSCOPY: 2019, repeat 5 years     DEXA:   7/12/22 Lowest T score -2.6 in right Hip  6/9/2020  lowest T score -2.5 in left hip     OPHTHALMOLOGY: current.    DERMATOLOGY   Dr. Escobar.   Current but does have a eye lesion currently that needs to be removed.       Nia Fletcher MD

## 2023-10-24 ENCOUNTER — OFFICE VISIT (OUTPATIENT)
Dept: PRIMARY CARE | Facility: CLINIC | Age: 68
End: 2023-10-24
Payer: MEDICARE

## 2023-10-24 VITALS
BODY MASS INDEX: 23.99 KG/M2 | TEMPERATURE: 97.7 F | SYSTOLIC BLOOD PRESSURE: 120 MMHG | WEIGHT: 140.5 LBS | DIASTOLIC BLOOD PRESSURE: 79 MMHG | OXYGEN SATURATION: 95 % | HEIGHT: 64 IN | HEART RATE: 85 BPM

## 2023-10-24 DIAGNOSIS — E78.5 HYPERLIPIDEMIA, UNSPECIFIED HYPERLIPIDEMIA TYPE: ICD-10-CM

## 2023-10-24 DIAGNOSIS — J20.9 ACUTE BRONCHITIS, UNSPECIFIED: ICD-10-CM

## 2023-10-24 DIAGNOSIS — Z00.00 ANNUAL PHYSICAL EXAM: Primary | ICD-10-CM

## 2023-10-24 DIAGNOSIS — R91.8 MULTIPLE PULMONARY NODULES: ICD-10-CM

## 2023-10-24 DIAGNOSIS — J40 BRONCHITIS: ICD-10-CM

## 2023-10-24 PROCEDURE — 1159F MED LIST DOCD IN RCRD: CPT | Performed by: INTERNAL MEDICINE

## 2023-10-24 PROCEDURE — 1126F AMNT PAIN NOTED NONE PRSNT: CPT | Performed by: INTERNAL MEDICINE

## 2023-10-24 PROCEDURE — 1036F TOBACCO NON-USER: CPT | Performed by: INTERNAL MEDICINE

## 2023-10-24 PROCEDURE — 1160F RVW MEDS BY RX/DR IN RCRD: CPT | Performed by: INTERNAL MEDICINE

## 2023-10-24 PROCEDURE — UHSPHYS PR UH SELECT PHYSICAL: Performed by: INTERNAL MEDICINE

## 2023-10-24 PROCEDURE — 3078F DIAST BP <80 MM HG: CPT | Performed by: INTERNAL MEDICINE

## 2023-10-24 PROCEDURE — 3074F SYST BP LT 130 MM HG: CPT | Performed by: INTERNAL MEDICINE

## 2023-10-24 RX ORDER — ALBUTEROL SULFATE 90 UG/1
2 AEROSOL, METERED RESPIRATORY (INHALATION) EVERY 6 HOURS PRN
Qty: 8 G | Refills: 3 | Status: SHIPPED | OUTPATIENT
Start: 2023-10-24

## 2023-10-24 RX ORDER — FLUTICASONE PROPIONATE 220 UG/1
2 AEROSOL, METERED RESPIRATORY (INHALATION)
Qty: 12 G | Refills: 5 | Status: SHIPPED | OUTPATIENT
Start: 2023-10-24 | End: 2024-10-23

## 2023-10-24 NOTE — PATIENT INSTRUCTIONS
Please obtain fasting labs  I have ordered a  short term 6 month chest CT  follow up for multiple pulmonary nodules and wheezes  Restart the flovent inhaler twice a day for 2 weeks and use your albuterol inhaler for immediate bronchodilator for a couple of weeks   Try Pepcid AC twice a day for your acid reflux and see if your cough improves    Send me a my chart message in 2 weeks with an update  Discuss treatment for your osteoporosis with your rheumatologist  Use tylenol and Voltaren gel for your arthritis changes on your hand   I advise that you go get a second opinion for your eye skin lesion.  Dr. Fuentes Nance

## 2023-11-21 PROCEDURE — 88305 TISSUE EXAM BY PATHOLOGIST: CPT | Performed by: DERMATOLOGY

## 2023-11-22 ENCOUNTER — LAB REQUISITION (OUTPATIENT)
Dept: LAB | Facility: HOSPITAL | Age: 68
End: 2023-11-22
Payer: MEDICARE

## 2023-11-22 DIAGNOSIS — L98.9 DISORDER OF THE SKIN AND SUBCUTANEOUS TISSUE, UNSPECIFIED: ICD-10-CM

## 2023-11-27 LAB
LABORATORY COMMENT REPORT: NORMAL
PATH REPORT.FINAL DX SPEC: NORMAL
PATH REPORT.GROSS SPEC: NORMAL
PATH REPORT.MICROSCOPIC SPEC OTHER STN: NORMAL
PATH REPORT.RELEVANT HX SPEC: NORMAL
PATH REPORT.TOTAL CANCER: NORMAL

## 2023-12-07 DIAGNOSIS — J20.9 ACUTE BRONCHITIS, UNSPECIFIED: ICD-10-CM

## 2023-12-07 RX ORDER — BENZONATATE 100 MG/1
CAPSULE ORAL
Qty: 30 CAPSULE | Refills: 3 | Status: SHIPPED | OUTPATIENT
Start: 2023-12-07

## 2023-12-12 ENCOUNTER — APPOINTMENT (OUTPATIENT)
Dept: OBSTETRICS AND GYNECOLOGY | Facility: CLINIC | Age: 68
End: 2023-12-12
Payer: MEDICARE

## 2024-01-17 ENCOUNTER — TELEPHONE (OUTPATIENT)
Dept: PRIMARY CARE | Facility: CLINIC | Age: 69
End: 2024-01-17
Payer: MEDICARE

## 2024-01-17 DIAGNOSIS — R00.0 TACHYCARDIA: Primary | ICD-10-CM

## 2024-01-17 RX ORDER — METOPROLOL TARTRATE 25 MG/1
TABLET, FILM COATED ORAL
Qty: 60 TABLET | Refills: 0 | Status: SHIPPED | OUTPATIENT
Start: 2024-01-17 | End: 2024-02-15

## 2024-01-17 NOTE — TELEPHONE ENCOUNTER
Has been on namebrand verapamil for 30+ years to control her cluster headaches.  She states that in the past when she is try to come off of the verapamil she will get heart racing.  She is aware that they are stopping the namebrand formulation and would like to try to wean herself off of the verapamil.  We discussed that she should go to every other day for 2 weeks then every 3 days then every 4 days etc.  I will call in metoprolol for her to take as needed for heart racing.  I have asked her to obtain a pulse oximeter and blood pressure cuff so she can monitor her pulse and blood pressure while coming off of this medication.

## 2024-01-17 NOTE — TELEPHONE ENCOUNTER
Patient has been taking Verapamil for years; [employer] has discontinued the medication all together brand and generic does not help.    Patient would like to discuss       Please call  [phone]

## 2024-01-25 ENCOUNTER — OFFICE VISIT (OUTPATIENT)
Dept: PRIMARY CARE | Facility: CLINIC | Age: 69
End: 2024-01-25
Payer: MEDICARE

## 2024-01-25 ENCOUNTER — TELEPHONE (OUTPATIENT)
Dept: PRIMARY CARE | Facility: CLINIC | Age: 69
End: 2024-01-25

## 2024-01-25 VITALS
TEMPERATURE: 97.9 F | SYSTOLIC BLOOD PRESSURE: 112 MMHG | HEART RATE: 78 BPM | DIASTOLIC BLOOD PRESSURE: 64 MMHG | OXYGEN SATURATION: 98 %

## 2024-01-25 DIAGNOSIS — M79.671 RIGHT FOOT PAIN: Primary | ICD-10-CM

## 2024-01-25 PROCEDURE — 1126F AMNT PAIN NOTED NONE PRSNT: CPT | Performed by: INTERNAL MEDICINE

## 2024-01-25 PROCEDURE — 3074F SYST BP LT 130 MM HG: CPT | Performed by: INTERNAL MEDICINE

## 2024-01-25 PROCEDURE — 1036F TOBACCO NON-USER: CPT | Performed by: INTERNAL MEDICINE

## 2024-01-25 PROCEDURE — 99213 OFFICE O/P EST LOW 20 MIN: CPT | Performed by: INTERNAL MEDICINE

## 2024-01-25 PROCEDURE — 3078F DIAST BP <80 MM HG: CPT | Performed by: INTERNAL MEDICINE

## 2024-01-25 NOTE — TELEPHONE ENCOUNTER
Patient having problems with right foot, hard time to walk on, helps with when she wears support stocking, this is the foot she had broken ankle in past.    She is not sure if you need to see her or get X-rays possibly could be arthritis.    Please advise  [phone]

## 2024-01-25 NOTE — PROGRESS NOTES
Subjective   Patient ID: Nessa Valle is a 68 y.o. female who presents for Foot Pain.    HPI 10 day history of painful right foot. No known injury. Started the evening after she was cleaning her house and going up and down stairs.  Same side as her fractured ankle from 3 years ago.  No other concerns today.  She has not tried anything over the counter for pain and or swelling         Review of Systems    Objective   /64 (BP Location: Left arm, Patient Position: Sitting)   Pulse 78   Temp 36.6 °C (97.9 °F)   SpO2 98%     Physical Exam  Musculoskeletal:        Feet:    Feet:      Comments: Right Foot  Area of pain to palpation/ mild swelling on dorsal side   Skin is normal as is neurovascular exam.  Full range of motion.         Assessment/Plan   Suspect Inflammation around an arthritic joint  Advised start voltaren gel bid to tid to foot  Problem List Items Addressed This Visit    None  Visit Diagnoses         Codes    Right foot pain    -  Primary M79.671    Relevant Orders    XR foot right 3+ views

## 2024-01-26 ENCOUNTER — HOSPITAL ENCOUNTER (OUTPATIENT)
Dept: RADIOLOGY | Facility: CLINIC | Age: 69
Discharge: HOME | End: 2024-01-26
Payer: MEDICARE

## 2024-01-26 DIAGNOSIS — M79.671 RIGHT FOOT PAIN: ICD-10-CM

## 2024-01-26 PROCEDURE — 73630 X-RAY EXAM OF FOOT: CPT | Mod: RT

## 2024-01-26 PROCEDURE — 73630 X-RAY EXAM OF FOOT: CPT | Mod: RIGHT SIDE | Performed by: RADIOLOGY

## 2024-02-15 DIAGNOSIS — R00.0 TACHYCARDIA: ICD-10-CM

## 2024-02-15 RX ORDER — METOPROLOL TARTRATE 25 MG/1
TABLET, FILM COATED ORAL
Qty: 60 TABLET | Refills: 0 | Status: SHIPPED | OUTPATIENT
Start: 2024-02-15

## 2024-02-29 ENCOUNTER — TELEPHONE (OUTPATIENT)
Dept: PULMONOLOGY | Facility: CLINIC | Age: 69
End: 2024-02-29
Payer: MEDICARE

## 2024-02-29 DIAGNOSIS — R05.3 CHRONIC COUGH: Primary | ICD-10-CM

## 2024-02-29 RX ORDER — AZITHROMYCIN 250 MG/1
TABLET, FILM COATED ORAL
Qty: 6 TABLET | Refills: 0 | Status: SHIPPED | OUTPATIENT
Start: 2024-02-29 | End: 2024-03-05

## 2024-02-29 NOTE — TELEPHONE ENCOUNTER
Patient sts she has been having some heaviness in her  lungs for several weeks. Also c/o productive cough with dark colored mucus. Please call to advise.

## 2024-03-19 ENCOUNTER — TELEPHONE (OUTPATIENT)
Dept: GASTROENTEROLOGY | Facility: CLINIC | Age: 69
End: 2024-03-19
Payer: MEDICARE

## 2024-03-19 NOTE — TELEPHONE ENCOUNTER
Pt has questions about having a colonoscopy after having bowel surgery.  She would like to speak to you.

## 2024-03-19 NOTE — TELEPHONE ENCOUNTER
Questions about colonoscopy.  Wondered about if she could use the MiraLAX Gatorade/Dulcolax prep and I told her that was okay.  Since her sigmoid resection in 2017 her bowel movements have been erratic and often associated with cramping.  Gets backed up and has diarrhea.  Fiber was ineffective for her.  Recommended magnesium glycinate 200 mg daily to start with and possibly increasing from there depending on the response.

## 2024-03-27 ENCOUNTER — OFFICE VISIT (OUTPATIENT)
Dept: GASTROENTEROLOGY | Facility: CLINIC | Age: 69
End: 2024-03-27
Payer: MEDICARE

## 2024-03-27 ENCOUNTER — LAB (OUTPATIENT)
Dept: LAB | Facility: LAB | Age: 69
End: 2024-03-27
Payer: MEDICARE

## 2024-03-27 ENCOUNTER — TELEPHONE (OUTPATIENT)
Dept: GASTROENTEROLOGY | Facility: CLINIC | Age: 69
End: 2024-03-27

## 2024-03-27 VITALS — BODY MASS INDEX: 24.02 KG/M2 | HEIGHT: 64 IN | HEART RATE: 91 BPM | OXYGEN SATURATION: 96 % | WEIGHT: 140.7 LBS

## 2024-03-27 DIAGNOSIS — R10.32 CHRONIC LEFT LOWER QUADRANT PAIN: ICD-10-CM

## 2024-03-27 DIAGNOSIS — G89.29 CHRONIC LEFT LOWER QUADRANT PAIN: ICD-10-CM

## 2024-03-27 DIAGNOSIS — K59.09 CHRONIC CONSTIPATION: ICD-10-CM

## 2024-03-27 DIAGNOSIS — K59.09 CHRONIC CONSTIPATION: Primary | ICD-10-CM

## 2024-03-27 PROBLEM — K57.32 DIVERTICULITIS OF LARGE INTESTINE WITHOUT PERFORATION OR ABSCESS WITHOUT BLEEDING: Status: RESOLVED | Noted: 2017-05-11 | Resolved: 2024-03-27

## 2024-03-27 LAB
ANION GAP SERPL CALC-SCNC: 12 MMOL/L (ref 10–20)
BASOPHILS # BLD AUTO: 0.06 X10*3/UL (ref 0–0.1)
BASOPHILS NFR BLD AUTO: 1 %
BUN SERPL-MCNC: 11 MG/DL (ref 6–23)
CALCIUM SERPL-MCNC: 10.1 MG/DL (ref 8.6–10.6)
CHLORIDE SERPL-SCNC: 104 MMOL/L (ref 98–107)
CO2 SERPL-SCNC: 30 MMOL/L (ref 21–32)
CREAT SERPL-MCNC: 0.88 MG/DL (ref 0.5–1.05)
CRP SERPL-MCNC: 0.95 MG/DL
EGFRCR SERPLBLD CKD-EPI 2021: 71 ML/MIN/1.73M*2
EOSINOPHIL # BLD AUTO: 0.15 X10*3/UL (ref 0–0.7)
EOSINOPHIL NFR BLD AUTO: 2.5 %
ERYTHROCYTE [DISTWIDTH] IN BLOOD BY AUTOMATED COUNT: 12.2 % (ref 11.5–14.5)
GLUCOSE SERPL-MCNC: 84 MG/DL (ref 74–99)
HCT VFR BLD AUTO: 41.4 % (ref 36–46)
HGB BLD-MCNC: 13.6 G/DL (ref 12–16)
IMM GRANULOCYTES # BLD AUTO: 0.01 X10*3/UL (ref 0–0.7)
IMM GRANULOCYTES NFR BLD AUTO: 0.2 % (ref 0–0.9)
LYMPHOCYTES # BLD AUTO: 2.18 X10*3/UL (ref 1.2–4.8)
LYMPHOCYTES NFR BLD AUTO: 37 %
MCH RBC QN AUTO: 29.8 PG (ref 26–34)
MCHC RBC AUTO-ENTMCNC: 32.9 G/DL (ref 32–36)
MCV RBC AUTO: 91 FL (ref 80–100)
MONOCYTES # BLD AUTO: 0.47 X10*3/UL (ref 0.1–1)
MONOCYTES NFR BLD AUTO: 8 %
NEUTROPHILS # BLD AUTO: 3.02 X10*3/UL (ref 1.2–7.7)
NEUTROPHILS NFR BLD AUTO: 51.3 %
NRBC BLD-RTO: 0 /100 WBCS (ref 0–0)
PLATELET # BLD AUTO: 257 X10*3/UL (ref 150–450)
POTASSIUM SERPL-SCNC: 4.3 MMOL/L (ref 3.5–5.3)
RBC # BLD AUTO: 4.56 X10*6/UL (ref 4–5.2)
SODIUM SERPL-SCNC: 142 MMOL/L (ref 136–145)
WBC # BLD AUTO: 5.9 X10*3/UL (ref 4.4–11.3)

## 2024-03-27 PROCEDURE — 80048 BASIC METABOLIC PNL TOTAL CA: CPT

## 2024-03-27 PROCEDURE — 36415 COLL VENOUS BLD VENIPUNCTURE: CPT

## 2024-03-27 PROCEDURE — 1160F RVW MEDS BY RX/DR IN RCRD: CPT | Performed by: INTERNAL MEDICINE

## 2024-03-27 PROCEDURE — 85025 COMPLETE CBC W/AUTO DIFF WBC: CPT

## 2024-03-27 PROCEDURE — 86140 C-REACTIVE PROTEIN: CPT

## 2024-03-27 PROCEDURE — 99214 OFFICE O/P EST MOD 30 MIN: CPT | Performed by: INTERNAL MEDICINE

## 2024-03-27 PROCEDURE — 1159F MED LIST DOCD IN RCRD: CPT | Performed by: INTERNAL MEDICINE

## 2024-03-27 NOTE — PROGRESS NOTES
"Subjective     History of Present Illness:   Nessa Valle is a 68 y.o. female who presents to GI clinic for couple episodes of post-prandial \"explosive\" diarrhea then pain LLQ for the last couple days. Has taken Advil. Bloating after eating.   Has off and on diarrhea and feels \"spasms\" at those times and takes Imodium.  Pain is always LLQ. No fever. No blood in stool.  Usually constipated. Feels it makes her bloat quickly and restricts ability to eat.  Feels worse at night.  Having some nausea now.    Remote past had Xifaxan with some help.        Review of Systems  Review of Systems    Social History   reports that she has quit smoking. Her smoking use included cigarettes. She has a 3.00 pack-year smoking history. She has never used smokeless tobacco. She reports that she does not currently use alcohol. She reports that she does not use drugs.     Allergies  Allergies   Allergen Reactions    Iodine Other, Anaphylaxis and Hives     Shrimp causes hives, facial swelling    Sulfa (Sulfonamide Antibiotics) Other, Hives, Rash and Swelling     hives, headache   swelling hands, face     hives, headache swelling hands, face    Banana Hives    Cantaloupe Hives    Orange Hives    Strawberry Hives    Aluminum Hives    Esomeprazole Other     Cluster migraines, constipation, dizziness    Esomeprazole Magnesium Unknown     Cluster migraines, constipation, dizziness    Lactose Hives     lactose intolerant    Levofloxacin Other and Unknown     Leg pain, numbness, tingling    Moxifloxacin Other and Hives     Leg pain, numbness, tingling    Ofloxacin Unknown    Shrimp Unknown    Tomato Other     sores in mouth       Medications  Current Outpatient Medications   Medication Instructions    acetaminophen (TYLENOL) 325 mg, oral, Every 4 hours PRN    albuterol 90 mcg/actuation inhaler 2 puffs, inhalation, Every 6 hours PRN    benzonatate (Tessalon) 100 mg capsule TAKE 1 CAPSULE 3 TIMES DAILY AS NEEDED FOR COUGH    biotin 5 mg capsule 1 " capsule, oral, Daily    Calan  mg ER tablet TAKE 1-2 TABLET ORALLY ONCE A DAY 90 DAYS    cholecalciferol (VITAMIN D-3) 2,000 Units, oral, Daily    docusate sodium (Colace) 100 mg capsule 1 capsule, oral, 2 times daily PRN    estradiol (ESTRACE) 2 g, vaginal, Weekly    fluticasone (Flovent) 220 mcg/actuation inhaler 2 puffs, inhalation, 2 times daily RT, Rinse mouth with water after use to reduce aftertaste and incidence of candidiasis. Do not swallow.    hydrocortisone 2.5 % cream As needed    inhalational spacing device inhaler Use as directed with inhalers    metoprolol tartrate (Lopressor) 25 mg tablet TAKE 1 TABLET BY MOUTH TWICE A DAY AS NEEDED TACHYCARDIA FOR HR > 100 OR BP >150 SYSTOLIC    neomycin-polymyxin-HC (Cortisporin) otic solution 3 drops, Each Ear, 3 times daily    nystatin (Mycostatin) ointment Topical    rizatriptan (MAXALT) 10 mg, oral, Once as needed, take 1 tablet by mouth AT ONSET OF HEADACHE may repeat in 2 hours IF headache PERSISTS maximum daily dose of 3<BR>    triamcinolone (Kenalog) 0.1 % cream APPLY 2-3 TIMES DAILY TO AFFECTED AREA ON YOUR BACK    valACYclovir (Valtrex) 1 gram tablet 1 tablet, oral, 3 times daily        Objective   Visit Vitals  Pulse 91      Physical Exam  Constitutional:       Appearance: Normal appearance.   HENT:      Mouth/Throat:      Mouth: Mucous membranes are moist.      Pharynx: Oropharynx is clear.   Eyes:      Extraocular Movements: Extraocular movements intact.      Pupils: Pupils are equal, round, and reactive to light.   Cardiovascular:      Rate and Rhythm: Normal rate and regular rhythm.      Heart sounds: No murmur heard.  Pulmonary:      Effort: Pulmonary effort is normal.      Breath sounds: Normal breath sounds.   Abdominal:      General: Abdomen is flat. Bowel sounds are normal.      Palpations: Abdomen is soft. There is no mass.      Tenderness: There is abdominal tenderness (LLQ tenderness to deep palpation).   Skin:     General: Skin is  warm and dry.   Neurological:      Mental Status: She is alert.   Psychiatric:         Mood and Affect: Mood normal.         Behavior: Behavior normal.         Thought Content: Thought content normal.         Judgment: Judgment normal.                   Assessment/Plan   Nessa Valle is a 68 y.o. female who presents to GI clinic for intermittent diarrhea against a backdrop of constipation. LLQ pain not associated with other signs of diverticulitis. Likely postresection motility disturbance/adhesions/possible SIBO.    Plan:  Try the probiotic/prebiotic (Seed)  Magnesium oxide start with 400 daily but can increase to 800 mg if needed - work to prevent longer than 3 days with no BM  Avoid the higher fat/greasier foods  Let me know if any problems      Low Zuñiga MD

## 2024-03-27 NOTE — PATIENT INSTRUCTIONS
Plan:  Try the probiotic/prebiotic (Seed)  Magnesium oxide start with 400 daily but can increase to 800 mg if needed - work to prevent longer than 3 days with no BM  Avoid the higher fat/greasier foods  Let me know if any problems

## 2024-03-27 NOTE — TELEPHONE ENCOUNTER
Pt has a sharp pain on her left side. It started on Sunday after eating pizza and salad. She had diarrhea that stopped quickly.  She is concerned because she is leaving to go out of town.

## 2024-04-04 NOTE — PROGRESS NOTES
Patient has had bronchitis for 3 weeks.  She is sensitive to many antibiotics and has had bronchitis in the past.  She can tolerate a Z-Janes.  We will try that.  If no better she will get a chest x-ray.   
Ambulatory

## 2024-04-18 ENCOUNTER — OFFICE VISIT (OUTPATIENT)
Dept: PRIMARY CARE | Facility: CLINIC | Age: 69
End: 2024-04-18
Payer: MEDICARE

## 2024-04-18 VITALS
SYSTOLIC BLOOD PRESSURE: 113 MMHG | DIASTOLIC BLOOD PRESSURE: 73 MMHG | HEART RATE: 80 BPM | OXYGEN SATURATION: 97 % | TEMPERATURE: 97.9 F

## 2024-04-18 DIAGNOSIS — R91.8 MULTIPLE PULMONARY NODULES: ICD-10-CM

## 2024-04-18 DIAGNOSIS — R10.9 ABDOMINAL PAIN, UNSPECIFIED ABDOMINAL LOCATION: Primary | ICD-10-CM

## 2024-04-18 PROCEDURE — 1160F RVW MEDS BY RX/DR IN RCRD: CPT | Performed by: INTERNAL MEDICINE

## 2024-04-18 PROCEDURE — 3078F DIAST BP <80 MM HG: CPT | Performed by: INTERNAL MEDICINE

## 2024-04-18 PROCEDURE — 99214 OFFICE O/P EST MOD 30 MIN: CPT | Performed by: INTERNAL MEDICINE

## 2024-04-18 PROCEDURE — 3074F SYST BP LT 130 MM HG: CPT | Performed by: INTERNAL MEDICINE

## 2024-04-18 PROCEDURE — 1159F MED LIST DOCD IN RCRD: CPT | Performed by: INTERNAL MEDICINE

## 2024-04-18 ASSESSMENT — ENCOUNTER SYMPTOMS: ABDOMINAL PAIN: 1

## 2024-04-18 NOTE — PROGRESS NOTES
"Subjective   Patient ID: Nessa aVlle is a 68 y.o. female who presents for Abdominal Pain.    Abdominal Pain       Here for same day appointment for worsening abdominal pain.   Has recently consulted with Dr. Zuñiga in GI who felt she had a post resection motility disturbance v. Adhesions v. SIBO.  She awoke this past Tuesday with worsening pain under her ribcage and radiating \"down\". Now she has pain along her whole abdomen. She has decreased her food intake as eating makes the pain worse.  No fever but \"very tired.\"  She feels like the pain is similar to when she had  diverticulitis.  Her Constipation continues to be a problem for her. She is scheduled for a colonoscopy at the end of may.  Chills last night. No fever.  No nausea or vomiting.  When she starts walking around she feels better.  Sitting and rising is the most tender.      History of recurrent URI's and chronic cough, chest tightness and congestions.  Multiple pulmonary nodules noted on CT Lungs in May 2023 with 1 year repeat advised.  PFTs are normal.  Immunoglobulins checked and normal . Still coughing.       Review of Systems   Gastrointestinal:  Positive for abdominal pain.       Objective   /73 (BP Location: Left arm, Patient Position: Sitting)   Pulse 80   Temp 36.6 °C (97.9 °F)   SpO2 97%     Physical Exam  Constitutional:       Appearance: Normal appearance. She is not ill-appearing.   Cardiovascular:      Rate and Rhythm: Normal rate and regular rhythm.   Pulmonary:      Effort: Pulmonary effort is normal.      Breath sounds: Normal breath sounds.   Abdominal:      General: Bowel sounds are normal. There is no distension.      Palpations: Abdomen is soft. There is no mass.      Tenderness: There is no abdominal tenderness. There is no guarding or rebound.   Neurological:      Mental Status: She is alert.         Assessment/Plan   Abdominal pain in the setting of known chronic functional bowel disorder and chronic constipation. " "History of  recurrent diverticulitis status post sigmoidectomy in 2017.  She has an upcoming colonoscopy scheduled in 4 weeks as part of a work up forepisodes of post-prandial \"explosive\" diarrhea then pain LLQ and  Bloating after eating. Her abdominal exam is benign today and I suspect her pain is secondary to exacerbation of her bloating and constipation with an element of muscular pain.   It would be reasonable with her recurrent diverticulitis and worsening symptoms to  also update her CT abdomen and pelvis. She is also due for a chest CT Scan  for multiple pulmonary nodules and persistent cough which we can get at the same time.       Problem List Items Addressed This Visit             ICD-10-CM    Multiple pulmonary nodules R91.8    Relevant Orders    CBC and Auto Differential    Sedimentation rate, automated    Comprehensive metabolic panel    Lipase    CT chest wo IV contrast    CT abdomen pelvis wo IV contrast    Abdominal pain - Primary R10.9    Relevant Orders    CBC and Auto Differential    Sedimentation rate, automated    Comprehensive metabolic panel    Lipase    CT chest wo IV contrast    CT abdomen pelvis wo IV contrast          "

## 2024-04-19 ENCOUNTER — HOSPITAL ENCOUNTER (OUTPATIENT)
Dept: RADIOLOGY | Facility: CLINIC | Age: 69
Discharge: HOME | End: 2024-04-19
Payer: MEDICARE

## 2024-04-19 DIAGNOSIS — R91.8 MULTIPLE PULMONARY NODULES: ICD-10-CM

## 2024-04-19 DIAGNOSIS — R10.9 ABDOMINAL PAIN, UNSPECIFIED ABDOMINAL LOCATION: ICD-10-CM

## 2024-04-19 PROCEDURE — 74176 CT ABD & PELVIS W/O CONTRAST: CPT

## 2024-04-19 PROCEDURE — 71250 CT THORAX DX C-: CPT | Performed by: RADIOLOGY

## 2024-04-19 PROCEDURE — 71250 CT THORAX DX C-: CPT

## 2024-04-19 PROCEDURE — 74176 CT ABD & PELVIS W/O CONTRAST: CPT | Performed by: RADIOLOGY

## 2024-04-20 ENCOUNTER — TELEPHONE (OUTPATIENT)
Dept: PRIMARY CARE | Facility: CLINIC | Age: 69
End: 2024-04-20
Payer: MEDICARE

## 2024-04-20 DIAGNOSIS — K57.92 DIVERTICULITIS: Primary | ICD-10-CM

## 2024-04-20 RX ORDER — AMOXICILLIN AND CLAVULANATE POTASSIUM 400; 57 MG/5ML; MG/5ML
875 POWDER, FOR SUSPENSION ORAL 2 TIMES DAILY
Qty: 305.2 ML | Refills: 0 | Status: SHIPPED | OUTPATIENT
Start: 2024-04-20 | End: 2024-05-04

## 2024-04-20 NOTE — TELEPHONE ENCOUNTER
Spoke with Nessa. She has a sensitivity to cipro ( pain behind her knees) and would like to start with Augmentin for treatment of her diverticulitis. She requests the liquid suspension   Will treat for 14 days. She has a follow up with Dr. Zuñiga

## 2024-04-22 ENCOUNTER — APPOINTMENT (OUTPATIENT)
Dept: RADIOLOGY | Facility: CLINIC | Age: 69
End: 2024-04-22
Payer: MEDICARE

## 2024-04-22 ENCOUNTER — TELEPHONE (OUTPATIENT)
Dept: PRIMARY CARE | Facility: CLINIC | Age: 69
End: 2024-04-22
Payer: MEDICARE

## 2024-04-22 NOTE — TELEPHONE ENCOUNTER
She received your message.    Update-  Pain is better, having significant diarrhea.    Please call if you need to discuss  [phone]

## 2024-04-23 NOTE — TELEPHONE ENCOUNTER
Pain is improving but still present especially after eating. She is having significant diarrhea immediately after taking the antibiotic dose. Advised probiotic and can use imodium if she needs to for leaving the house

## 2024-04-29 ENCOUNTER — TELEPHONE (OUTPATIENT)
Dept: GASTROENTEROLOGY | Facility: CLINIC | Age: 69
End: 2024-04-29
Payer: MEDICARE

## 2024-04-29 NOTE — TELEPHONE ENCOUNTER
Pt states she was taking the Augmentin and was feeling better. The pain has now come back. She would like to speak with you.

## 2024-04-29 NOTE — TELEPHONE ENCOUNTER
Was feeling better for several days after starting on the Augmentin 8 days ago and then pain again last night and today.  Bowel movements have been diminished.  Told her to get the CBC that already had an order in the chart, continue with the Augmentin.  Will decide about further management based on her response.  Also 3 extra doses of MiraLAX to try to cleanout.  Will ultimately do colonoscopy 8 weeks after her symptoms resolved.

## 2024-05-07 ENCOUNTER — LAB (OUTPATIENT)
Dept: LAB | Facility: LAB | Age: 69
End: 2024-05-07
Payer: MEDICARE

## 2024-05-07 DIAGNOSIS — R91.8 MULTIPLE PULMONARY NODULES: ICD-10-CM

## 2024-05-07 DIAGNOSIS — R10.9 ABDOMINAL PAIN, UNSPECIFIED ABDOMINAL LOCATION: ICD-10-CM

## 2024-05-07 LAB
ALBUMIN SERPL BCP-MCNC: 4.2 G/DL (ref 3.4–5)
ALP SERPL-CCNC: 74 U/L (ref 33–136)
ALT SERPL W P-5'-P-CCNC: 15 U/L (ref 7–45)
ANION GAP SERPL CALC-SCNC: 12 MMOL/L (ref 10–20)
AST SERPL W P-5'-P-CCNC: 16 U/L (ref 9–39)
BASOPHILS # BLD AUTO: 0.06 X10*3/UL (ref 0–0.1)
BASOPHILS NFR BLD AUTO: 1.2 %
BILIRUB SERPL-MCNC: 0.5 MG/DL (ref 0–1.2)
BUN SERPL-MCNC: 13 MG/DL (ref 6–23)
CALCIUM SERPL-MCNC: 9.7 MG/DL (ref 8.6–10.6)
CHLORIDE SERPL-SCNC: 104 MMOL/L (ref 98–107)
CO2 SERPL-SCNC: 29 MMOL/L (ref 21–32)
CREAT SERPL-MCNC: 0.82 MG/DL (ref 0.5–1.05)
EGFRCR SERPLBLD CKD-EPI 2021: 78 ML/MIN/1.73M*2
EOSINOPHIL # BLD AUTO: 0.24 X10*3/UL (ref 0–0.7)
EOSINOPHIL NFR BLD AUTO: 4.6 %
ERYTHROCYTE [DISTWIDTH] IN BLOOD BY AUTOMATED COUNT: 12.5 % (ref 11.5–14.5)
ERYTHROCYTE [SEDIMENTATION RATE] IN BLOOD BY WESTERGREN METHOD: 3 MM/H (ref 0–30)
GLUCOSE SERPL-MCNC: 76 MG/DL (ref 74–99)
HCT VFR BLD AUTO: 41.4 % (ref 36–46)
HGB BLD-MCNC: 13.7 G/DL (ref 12–16)
IMM GRANULOCYTES # BLD AUTO: 0.01 X10*3/UL (ref 0–0.7)
IMM GRANULOCYTES NFR BLD AUTO: 0.2 % (ref 0–0.9)
LIPASE SERPL-CCNC: 6 U/L (ref 9–82)
LYMPHOCYTES # BLD AUTO: 2.13 X10*3/UL (ref 1.2–4.8)
LYMPHOCYTES NFR BLD AUTO: 41.2 %
MCH RBC QN AUTO: 30 PG (ref 26–34)
MCHC RBC AUTO-ENTMCNC: 33.1 G/DL (ref 32–36)
MCV RBC AUTO: 91 FL (ref 80–100)
MONOCYTES # BLD AUTO: 0.5 X10*3/UL (ref 0.1–1)
MONOCYTES NFR BLD AUTO: 9.7 %
NEUTROPHILS # BLD AUTO: 2.23 X10*3/UL (ref 1.2–7.7)
NEUTROPHILS NFR BLD AUTO: 43.1 %
NRBC BLD-RTO: 0 /100 WBCS (ref 0–0)
PLATELET # BLD AUTO: 276 X10*3/UL (ref 150–450)
POTASSIUM SERPL-SCNC: 4.5 MMOL/L (ref 3.5–5.3)
PROT SERPL-MCNC: 6.6 G/DL (ref 6.4–8.2)
RBC # BLD AUTO: 4.57 X10*6/UL (ref 4–5.2)
SODIUM SERPL-SCNC: 140 MMOL/L (ref 136–145)
WBC # BLD AUTO: 5.2 X10*3/UL (ref 4.4–11.3)

## 2024-05-07 PROCEDURE — 83690 ASSAY OF LIPASE: CPT

## 2024-05-07 PROCEDURE — 36415 COLL VENOUS BLD VENIPUNCTURE: CPT

## 2024-05-07 PROCEDURE — 85025 COMPLETE CBC W/AUTO DIFF WBC: CPT

## 2024-05-07 PROCEDURE — 80053 COMPREHEN METABOLIC PANEL: CPT

## 2024-05-07 PROCEDURE — 85652 RBC SED RATE AUTOMATED: CPT

## 2024-05-08 ENCOUNTER — TELEPHONE (OUTPATIENT)
Dept: PRIMARY CARE | Facility: CLINIC | Age: 69
End: 2024-05-08
Payer: MEDICARE

## 2024-05-08 ENCOUNTER — HOSPITAL ENCOUNTER (OUTPATIENT)
Dept: RADIOLOGY | Facility: CLINIC | Age: 69
Discharge: HOME | End: 2024-05-08
Payer: MEDICARE

## 2024-05-08 DIAGNOSIS — R10.9 ABDOMINAL PAIN, UNSPECIFIED ABDOMINAL LOCATION: Primary | ICD-10-CM

## 2024-05-08 DIAGNOSIS — R10.9 ABDOMINAL PAIN, UNSPECIFIED ABDOMINAL LOCATION: ICD-10-CM

## 2024-05-08 PROCEDURE — 74022 RADEX COMPL AQT ABD SERIES: CPT | Performed by: RADIOLOGY

## 2024-05-08 PROCEDURE — 74019 RADEX ABDOMEN 2 VIEWS: CPT

## 2024-05-08 NOTE — TELEPHONE ENCOUNTER
Patient is still in a lot of pain.    Please call to discuss lab results and recommendations.  [phone]

## 2024-05-08 NOTE — TELEPHONE ENCOUNTER
Nessa called to report that she is still in a lot of pain despite finishing the course of Augmentin for diverticulitis.  It is a sharp pain that can wrap around her flank.  Pain is worse at night.  I reviewed her lab work with her which was significant for normal CBC and inflammatory markers.  Normal lipase.  She states that she seem to have improved a little bit on the antibiotics but not completely.  Her bowel movements continue to be diminished interspersed with diarrhea.  She has intermittently used MiraLAX.  I have asked her to go ahead and get plain acute abdominal series mostly to see the amount of stool burden.  I am reassured with her normal lab work.  If her stool burden is excessive and the rest of the KUB looks fairly unremarkable will consult with her gastroenterologist Dr. Zuñiga on a cleanout regimen for her.

## 2024-05-24 ENCOUNTER — APPOINTMENT (OUTPATIENT)
Dept: GASTROENTEROLOGY | Facility: EXTERNAL LOCATION | Age: 69
End: 2024-05-24
Payer: MEDICARE

## 2024-06-21 ENCOUNTER — APPOINTMENT (OUTPATIENT)
Dept: GASTROENTEROLOGY | Facility: EXTERNAL LOCATION | Age: 69
End: 2024-06-21
Payer: MEDICARE

## 2024-06-21 DIAGNOSIS — K57.30 DIVERTICULOSIS OF LARGE INTESTINE WITHOUT DIVERTICULITIS: ICD-10-CM

## 2024-06-21 DIAGNOSIS — Z86.010 PERSONAL HISTORY OF COLONIC POLYPS: ICD-10-CM

## 2024-06-21 DIAGNOSIS — Z98.0 INTESTINAL BYPASS OR ANASTOMOSIS STATUS: ICD-10-CM

## 2024-06-21 DIAGNOSIS — D12.2 BENIGN NEOPLASM OF ASCENDING COLON: ICD-10-CM

## 2024-06-21 DIAGNOSIS — Z12.11 SPECIAL SCREENING FOR MALIGNANT NEOPLASMS, COLON: Primary | ICD-10-CM

## 2024-06-21 DIAGNOSIS — K64.0 FIRST DEGREE HEMORRHOIDS: ICD-10-CM

## 2024-06-21 PROCEDURE — 45385 COLONOSCOPY W/LESION REMOVAL: CPT | Performed by: INTERNAL MEDICINE

## 2024-06-25 ENCOUNTER — LAB REQUISITION (OUTPATIENT)
Dept: LAB | Facility: HOSPITAL | Age: 69
End: 2024-06-25
Payer: MEDICARE

## 2024-07-03 LAB
LABORATORY COMMENT REPORT: NORMAL
PATH REPORT.FINAL DX SPEC: NORMAL
PATH REPORT.GROSS SPEC: NORMAL
PATH REPORT.RELEVANT HX SPEC: NORMAL
PATH REPORT.TOTAL CANCER: NORMAL

## 2024-07-29 ENCOUNTER — TELEPHONE (OUTPATIENT)
Dept: GASTROENTEROLOGY | Facility: CLINIC | Age: 69
End: 2024-07-29
Payer: MEDICARE

## 2024-07-29 NOTE — TELEPHONE ENCOUNTER
4 days ago began pain midline upper abd then better for a few days, worse this AM and now better.  No fever.   Will manage expectantly for now.

## 2024-07-29 NOTE — TELEPHONE ENCOUNTER
Pt believes she is having a diverticulitis flare.  It started last Thursday. The pain is in the same area as before.

## 2024-08-01 ENCOUNTER — TELEPHONE (OUTPATIENT)
Dept: GASTROENTEROLOGY | Facility: CLINIC | Age: 69
End: 2024-08-01
Payer: MEDICARE

## 2024-08-01 DIAGNOSIS — R19.7 DIARRHEA OF PRESUMED INFECTIOUS ORIGIN: Primary | ICD-10-CM

## 2024-08-01 NOTE — TELEPHONE ENCOUNTER
Pt is not feeling better. She is feeling worse.  She has pain under her breast bone and wraps around to her left side. She thinks it might be a diverticulitis flare.

## 2024-08-02 NOTE — PROGRESS NOTES
Pain is now moved around abdomen. Also having diarrhea. Improves after bomb movement.  Will check stool studies, CBC, CMP.

## 2024-08-05 ENCOUNTER — LAB (OUTPATIENT)
Dept: LAB | Facility: LAB | Age: 69
End: 2024-08-05
Payer: MEDICARE

## 2024-08-05 DIAGNOSIS — R19.7 DIARRHEA OF PRESUMED INFECTIOUS ORIGIN: ICD-10-CM

## 2024-08-05 LAB
ALBUMIN SERPL BCP-MCNC: 4.2 G/DL (ref 3.4–5)
ALP SERPL-CCNC: 81 U/L (ref 33–136)
ALT SERPL W P-5'-P-CCNC: 12 U/L (ref 7–45)
ANION GAP SERPL CALC-SCNC: 9 MMOL/L (ref 10–20)
AST SERPL W P-5'-P-CCNC: 16 U/L (ref 9–39)
BASOPHILS # BLD AUTO: 0.06 X10*3/UL (ref 0–0.1)
BASOPHILS NFR BLD AUTO: 0.9 %
BILIRUB SERPL-MCNC: 0.7 MG/DL (ref 0–1.2)
BUN SERPL-MCNC: 12 MG/DL (ref 6–23)
CALCIUM SERPL-MCNC: 9.6 MG/DL (ref 8.6–10.6)
CHLORIDE SERPL-SCNC: 104 MMOL/L (ref 98–107)
CO2 SERPL-SCNC: 29 MMOL/L (ref 21–32)
CREAT SERPL-MCNC: 0.94 MG/DL (ref 0.5–1.05)
EGFRCR SERPLBLD CKD-EPI 2021: 65 ML/MIN/1.73M*2
EOSINOPHIL # BLD AUTO: 0.27 X10*3/UL (ref 0–0.7)
EOSINOPHIL NFR BLD AUTO: 4.3 %
ERYTHROCYTE [DISTWIDTH] IN BLOOD BY AUTOMATED COUNT: 12.1 % (ref 11.5–14.5)
GLUCOSE SERPL-MCNC: 85 MG/DL (ref 74–99)
HCT VFR BLD AUTO: 42.6 % (ref 36–46)
HGB BLD-MCNC: 13.8 G/DL (ref 12–16)
IMM GRANULOCYTES # BLD AUTO: 0.01 X10*3/UL (ref 0–0.7)
IMM GRANULOCYTES NFR BLD AUTO: 0.2 % (ref 0–0.9)
LYMPHOCYTES # BLD AUTO: 1.97 X10*3/UL (ref 1.2–4.8)
LYMPHOCYTES NFR BLD AUTO: 31.2 %
MCH RBC QN AUTO: 30.5 PG (ref 26–34)
MCHC RBC AUTO-ENTMCNC: 32.4 G/DL (ref 32–36)
MCV RBC AUTO: 94 FL (ref 80–100)
MONOCYTES # BLD AUTO: 0.57 X10*3/UL (ref 0.1–1)
MONOCYTES NFR BLD AUTO: 9 %
NEUTROPHILS # BLD AUTO: 3.44 X10*3/UL (ref 1.2–7.7)
NEUTROPHILS NFR BLD AUTO: 54.4 %
NRBC BLD-RTO: 0 /100 WBCS (ref 0–0)
PLATELET # BLD AUTO: 267 X10*3/UL (ref 150–450)
POTASSIUM SERPL-SCNC: 4.4 MMOL/L (ref 3.5–5.3)
PROT SERPL-MCNC: 6.8 G/DL (ref 6.4–8.2)
RBC # BLD AUTO: 4.53 X10*6/UL (ref 4–5.2)
SODIUM SERPL-SCNC: 138 MMOL/L (ref 136–145)
WBC # BLD AUTO: 6.3 X10*3/UL (ref 4.4–11.3)

## 2024-08-05 PROCEDURE — 80053 COMPREHEN METABOLIC PANEL: CPT

## 2024-08-05 PROCEDURE — 85025 COMPLETE CBC W/AUTO DIFF WBC: CPT

## 2024-08-05 PROCEDURE — 36415 COLL VENOUS BLD VENIPUNCTURE: CPT

## 2024-08-08 ENCOUNTER — LAB (OUTPATIENT)
Dept: LAB | Facility: LAB | Age: 69
End: 2024-08-08
Payer: MEDICARE

## 2024-08-08 DIAGNOSIS — R19.7 DIARRHEA OF PRESUMED INFECTIOUS ORIGIN: ICD-10-CM

## 2024-08-08 LAB — C DIF TOX TCDA+TCDB STL QL NAA+PROBE: NOT DETECTED

## 2024-08-08 PROCEDURE — 87329 GIARDIA AG IA: CPT

## 2024-08-08 PROCEDURE — 87493 C DIFF AMPLIFIED PROBE: CPT

## 2024-08-08 PROCEDURE — 87328 CRYPTOSPORIDIUM AG IA: CPT

## 2024-08-08 PROCEDURE — 87506 IADNA-DNA/RNA PROBE TQ 6-11: CPT

## 2024-08-09 LAB

## 2024-08-10 LAB
CRYPTOSP AG STL QL IA: NEGATIVE
G LAMBLIA AG STL QL IA: NEGATIVE

## 2024-08-12 LAB — O+P STL MICRO: NEGATIVE

## 2024-08-13 ENCOUNTER — TELEPHONE (OUTPATIENT)
Dept: GASTROENTEROLOGY | Facility: CLINIC | Age: 69
End: 2024-08-13
Payer: MEDICARE

## 2024-08-23 ENCOUNTER — TELEPHONE (OUTPATIENT)
Dept: PRIMARY CARE | Facility: CLINIC | Age: 69
End: 2024-08-23
Payer: MEDICARE

## 2024-08-23 DIAGNOSIS — M54.50 ACUTE RIGHT-SIDED LOW BACK PAIN WITHOUT SCIATICA: Primary | ICD-10-CM

## 2024-08-23 DIAGNOSIS — M54.50 ACUTE RIGHT-SIDED LOW BACK PAIN WITHOUT SCIATICA: ICD-10-CM

## 2024-08-23 RX ORDER — TIZANIDINE 4 MG/1
TABLET ORAL
Qty: 1 TABLET | Refills: 0 | OUTPATIENT
Start: 2024-08-23

## 2024-08-23 RX ORDER — TIZANIDINE HYDROCHLORIDE 2 MG/1
CAPSULE, GELATIN COATED ORAL
Qty: 20 CAPSULE | Refills: 0 | Status: SHIPPED | OUTPATIENT
Start: 2024-08-23

## 2024-08-23 RX ORDER — METHOCARBAMOL 500 MG/1
500 TABLET, FILM COATED ORAL NIGHTLY PRN
Qty: 30 TABLET | Refills: 0 | Status: SHIPPED | OUTPATIENT
Start: 2024-08-23 | End: 2024-09-22

## 2024-08-23 NOTE — TELEPHONE ENCOUNTER
Patient has had back pain, close to the spine above left hip.  She had a CT Scan in April.    She would like to know if it showed any kidney stones or anything else because the pain is back, hard to sleep, uncomfortable. She does not want get another CT scan if she doesn't have to.    Please call  [phone]

## 2024-08-23 NOTE — TELEPHONE ENCOUNTER
"1 week history of Right lower back pain above and behind her right hip.  She was lifting her grandchildren but does not recall exactly any injury.  She has had similar pain in the past.  Most painful with moving or laying down.  She \"finally\" took an advil which did help however not as much as in the past.  Discussed the importance of continuing advil and using Voltaren as well.  Will add a muscle relaxer in the evening  for her. She did have a tiny kidney stone on CT in April that would not be the cause of this pain.   "

## 2024-09-16 ENCOUNTER — TELEPHONE (OUTPATIENT)
Dept: PRIMARY CARE | Facility: CLINIC | Age: 69
End: 2024-09-16
Payer: MEDICARE

## 2024-09-16 NOTE — TELEPHONE ENCOUNTER
Still having pain on the left side in the back area, not constant.   Ct scan done in April     She would like advice  Please call  [phone]

## 2024-09-16 NOTE — TELEPHONE ENCOUNTER
Since labor day she is having a flare of her sciatica after picking her 40 lb grandson.  Also has Upper side pain in  Advil doesn't alleviate that however it does alleviate her sciatic pain a little bit.  She also has some tingling and numbness along her foot.  She has not done formal physical therapy but has been doing some exercises which does seem to help.  She also has an appointment with a neurosurgeon she is seen in the past later this week.  We discussed that the upper back pain may be related to her whole picture of sciatica and a potential nerve impingement given how she is walking her adjusting her movements.  We also discussed that tingling and numbness is not necessarily an indication for surgery however it is a good idea for her to have this appointment to evaluate for any motor strength deficit.  She should continue the Advil for now.  Continue physical therapy discussed possibly adding gabpentin in the future if needed    Tingling and foot tingling and numbness  Has not done formal PT.      She went on line and done   Has appointment with a neurosurgeon later this week with Dr. Meredith.   Sciatic acted up labor day

## 2024-10-15 DIAGNOSIS — Z00.00 HEALTHCARE MAINTENANCE: ICD-10-CM

## 2024-10-21 ENCOUNTER — TELEPHONE (OUTPATIENT)
Dept: PRIMARY CARE | Facility: CLINIC | Age: 69
End: 2024-10-21
Payer: MEDICARE

## 2024-10-21 DIAGNOSIS — R19.7 DIARRHEA, UNSPECIFIED TYPE: Primary | ICD-10-CM

## 2024-10-21 DIAGNOSIS — R14.0 BLOATING: ICD-10-CM

## 2024-10-21 RX ORDER — DICYCLOMINE HYDROCHLORIDE 10 MG/1
10 CAPSULE ORAL 4 TIMES DAILY PRN
Qty: 90 CAPSULE | Refills: 0 | Status: SHIPPED | OUTPATIENT
Start: 2024-10-21 | End: 2024-12-20

## 2024-10-21 NOTE — TELEPHONE ENCOUNTER
Calling with persistent Diarrhea with every meal and right upper quadrant pain. Sharp and stabbing.  She has been taking a lot of colace- three or 4 a day.  The pain has continued.  She doesn't think she is constipated.  She is getting hungry but having difficulty eating.  She does get hungry though.  She is only eating bread and water.  =Eating makes it worse and she has diarrhea.  Discussed getting a abdominal xray to evaluate for stool burden.  Will call in bentyl for bloating and gas. Query SIBO causing diarrhea and bloating so will order a hydrogen breath test   Encouraged her to also be in touch with Dr. Zuñiga to follow up for further management recommendations.

## 2024-10-21 NOTE — TELEPHONE ENCOUNTER
Patient has been having abdominal pain with nausea x 2 weeks, waking her up at night. The pain starts under bra line and gets a sharp pain when presses.  She thought possibly gallbladder.    Please advise  [phone]

## 2024-11-13 DIAGNOSIS — R19.7 DIARRHEA, UNSPECIFIED TYPE: ICD-10-CM

## 2024-11-13 DIAGNOSIS — R14.0 BLOATING: ICD-10-CM

## 2024-11-13 RX ORDER — DICYCLOMINE HYDROCHLORIDE 10 MG/1
10 CAPSULE ORAL 4 TIMES DAILY PRN
Qty: 90 CAPSULE | Refills: 0 | Status: SHIPPED | OUTPATIENT
Start: 2024-11-13 | End: 2025-01-12

## 2024-11-20 ASSESSMENT — PROMIS GLOBAL HEALTH SCALE
CARRYOUT_PHYSICAL_ACTIVITIES: MOSTLY
CARRYOUT_SOCIAL_ACTIVITIES: GOOD
RATE_AVERAGE_PAIN: 4
RATE_PHYSICAL_HEALTH: GOOD
RATE_SOCIAL_SATISFACTION: GOOD
RATE_AVERAGE_FATIGUE: MILD
RATE_GENERAL_HEALTH: GOOD
RATE_MENTAL_HEALTH: GOOD
EMOTIONAL_PROBLEMS: RARELY
RATE_QUALITY_OF_LIFE: GOOD

## 2024-11-21 RX ORDER — NITROGLYCERIN 20 MG/G
OINTMENT TOPICAL
COMMUNITY
Start: 2024-07-29

## 2024-11-21 RX ORDER — NYSTATIN AND TRIAMCINOLONE ACETONIDE 100000; 1 [USP'U]/G; MG/G
OINTMENT TOPICAL
COMMUNITY
Start: 2024-11-05

## 2024-11-21 NOTE — PROGRESS NOTES
Physical Exam    Name Nessa Valle    Date of Service :11/22/2024      Nessa Valle is a 69 y.o. year old female who is being seen for a Medicare Wellness and [facility] Physical   Health Risk Assessment  In general, health is:  fair      Cluster Has/Migraine headaches-  has been on calan SR  , nurtec samples- follows with Adrienne Billingsley . Was given samples of nurtec as the calan brand is no longer made. She has tried over 27 diffierent types of generic meds with various symptoms   persistent Diarrhea or constipation. with every meal and right upper quadrant pain. Sharp and stabbing.   Recurrent back and left leg pain.  Saw Dr. Gonzalez at [facility] in September -  PT referral and medrol dose pack given   End stage right hip OA-    vaginal  vault prolapse  with Pessary in place    Is using estrace vaginal cream   age 41 Hysterectomy and BSO   Multiple pulmonary nodules noted on CT Lungs in May 2023 with 1 year repeat in April of 2024  shows stability advised. PFTs are normal. Immunoglobulins checked and normal .   Osteoporosis   last rheum 11/17/23 with dR. Freda Forte   zoledronic acid advised which she has not done   Lots of environmental allergies,      Current exercise habits:   doing core exercises. She walks  Dietary issues discussed: Yes    Cardiac Risk Assessment  Cardiovascular risk was discussed and, if needed, lifestyle modifications recommended, including nutritional choices, exercise, and elimination of habits contributing to risk. We agreed on a plan to reduce the current cardiovascular risk based on above discussion as needed.  Aspirin use/disuse was discussed after reviewing the updated guidelines :    Hearing difficulties:   Hyperacusis in left ear, right ear cannot hear out of it  Visual Acuity assessed:  Yes     In the past year have you fallen or had a near fall?:No    Activities of Daily Living  Needs help with grocery shopping, cooking, housework, bathing, grooming, dressing, eating, sitting or  "standing, walking, using the toilet, handling finances, taking medications, using the telephone, or driving:  NO     Safe in current home environment: yes  Concerns with balance:  Yes     Following safety precautions in the home environment and vehicle: removed throw rugs from floors, installed grab bars in the bathroom, handrails in stairwells, having adequate lighting, wearing seatbelt at all times?:  Yes     Depression Screen  (Note: if answer to either of the following is \"Yes\", then a more complete depression screening is indicated)   Q1: Over the past two weeks, have you felt down, depressed or hopeless? No  Q2: Over the past two weeks, have you felt little interest or pleasure in doing things? 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 2 daughters Mariola  who is an artist and influencer, Huma is an actor and lives in [place]. works for [employer]. Mariola lives in [place].    four grandsons.     Her diet: generally healthy    Tobacco-from age [age]yo 3/4 ppd     ETOH- NO    Exercise- nothing regular because of her pelvic prolapse and LLQ pain at site of anastomosis     Inconsistent walking due to chronic foot pain     Average alcohol consumption: none     Current Providers  Specialists: I have reviewed specialist-related care of the patient in the medical record.    Opioid use review  Patient use of opioids:  No.  Takes advil and tylenol for pain      Cognitive screening  Mini Cog Score:   5/5     Cognitive screening reviewed and plan:  Not needed      Functional Observation  Was the patient's timed Up & Go test unsteady or >= 12 seconds?  No      Advance Care " Planning  End of Life planning discussed, including patient's advanced directive wishes:  Yes     ---------------      Medical/Family history review  Reviewed and updated problem list, medical/surgical/family/social history, medications, and allergies.    Patient Active Problem List   Diagnosis    Allergic rhinitis    Chronic constipation    Irritable bowel syndrome    Chronic cough    Cluster headache    Cystocele, midline    Degenerative disc disease, lumbar    TMJ dysfunction    Reactive airway disease ([facility])    Primary osteoarthritis of right hip    Osteoporosis    Migraine with aura    Hyperlipidemia    Age-related osteoporosis without current pathological fracture    Bilateral fibrocystic breast changes    Breast cyst    Chronic left lower quadrant pain    Chronic midline low back pain without sciatica    Chronic pain of right ankle    Disorder of bone and articular cartilage    Disturbance of skin sensation    Dysfunction of left eustachian tube    Essential hypertension    Heterozygous MTHFR mutation [id number]    Impingement syndrome of right ankle    Inconclusive mammogram    Lactose intolerance    Malaise and fatigue    Osteopenia of multiple sites    Osteopenia    Paroxysmal SVT (supraventricular tachycardia) ([facility])    Prolapse of vaginal vault after hysterectomy    Pronation deformity of ankle, acquired, right    Tachycardia    Urinary hesitancy    Obstipation    Pelvic floor dysfunction    Rectal prolapse    Rectocele    Vertigo of central origin    Gastroesophageal reflux disease with esophagitis    Annual physical exam    Multiple pulmonary nodules    Abdominal pain        Past Medical History:   Diagnosis Date    Atypical chest pain     Cellulitis of right upper limb     Chronic constipation     Cystocele with uterine prolapse     Diverticulitis     Diverticulosis of colon     Foot fracture     Hematuria     Herpes zoster     History of ITP     Nonscarring hair loss, unspecified     Osteoarthritis  of lumbosacral spine with radiculopathy     SCC (squamous cell carcinoma)     Sinusitis     SVT (supraventricular tachycardia) (CMS-HCC)     Varicose veins of both lower extremities with pain     Vitamin D deficiency     Zoster         Past Surgical History:   Procedure Laterality Date    COLECTOMY PARTIAL / TOTAL  09/19/2017    Partial Colectomy Sigmoid    HAND SURGERY      HERNIA REPAIR      Inguinal hernia repair    HYSTERECTOMY      OOPHORECTOMY      SKIN CANCER EXCISION      Mohs Micrographic Surgery Arm Right    UMBILICAL HERNIA REPAIR  04/04/2014         Family History   Problem Relation Name Age of Onset    Ovarian cancer Mother          d.93    Osteoporosis Mother      Glaucoma Mother      Macular degeneration Mother      Hypertension Father          d. 58 of an accident    Other (CREST) Sister      Diabetes Sister      No Known Problems Brother      Stomach cancer Maternal Grandmother          d. 72    Stroke Maternal Grandfather          d. 72    COPD Maternal Grandfather      Heart failure Paternal Grandmother          d. 82    Diabetes Paternal Grandfather          d.64    Other (CHRONIC CONSTIPATION) Other FAMILY         Medications and Supplements  prescribed by me and other practitioners or clinical pharmacist (such as prescriptions, OTC's, herbal therapies and supplements) were reviewed and documented in the medical record.        Current Outpatient Medications:     acetaminophen (Tylenol) 325 mg tablet, Take 1 tablet (325 mg) by mouth every 4 hours if needed., Disp: , Rfl:     albuterol 90 mcg/actuation inhaler, Inhale 2 puffs every 6 hours if needed for wheezing or shortness of breath., Disp: 8 g, Rfl: 3    benzonatate (Tessalon) 100 mg capsule, TAKE 1 CAPSULE 3 TIMES DAILY AS NEEDED FOR COUGH, Disp: 30 capsule, Rfl: 3    biotin 5 mg capsule, Take 1 capsule (5 mg) by mouth once daily., Disp: , Rfl:     Calan  mg ER tablet, TAKE 1-2 TABLET ORALLY ONCE A DAY 90 DAYS, Disp: , Rfl:      cholecalciferol (Vitamin D-3) 50 MCG (2000 UT) tablet, Take 1 tablet (2,000 Units) by mouth once daily., Disp: , Rfl:     dicyclomine (Bentyl) 10 mg capsule, TAKE 1 CAPSULE (10 MG) BY MOUTH 4 TIMES A DAY AS NEEDED (ABDOMINAL PAIN OR CRAMPS)., Disp: 90 capsule, Rfl: 0    docusate sodium (Colace) 100 mg capsule, Take 1 capsule (100 mg) by mouth 2 times a day as needed for constipation., Disp: , Rfl:     estradiol (Estrace) 0.01 % (0.1 mg/gram) vaginal cream, Insert 0.5 Applicatorfuls (2 g) into the vagina 1 (one) time per week., Disp: , Rfl:     hydrocortisone 2.5 % cream, if needed., Disp: , Rfl:     neomycin-polymyxin-HC (Cortisporin) otic solution, Administer 3 drops into each ear 3 times a day., Disp: 10 mL, Rfl: 0    Nitro-Bid 2 % ointment, APPLY TO AFFECTED AREA AT BEDTIME UNTIL BETTER, Disp: , Rfl:     nystatin-triamcinolone (Mycolog II) ointment, APPLY TO CORNERS OF MOUTH TWICE A DAY WHEN FLARING, Disp: , Rfl:     rizatriptan (Maxalt) 10 mg tablet, Take 1 tablet (10 mg) by mouth 1 time if needed for migraine. take 1 tablet by mouth AT ONSET OF HEADACHE may repeat in 2 hours IF headache PERSISTS maximum daily dose of 3, Disp: , Rfl:     fluticasone (Flovent) 220 mcg/actuation inhaler, Inhale 2 puffs 2 times a day. Rinse mouth with water after use to reduce aftertaste and incidence of candidiasis. Do not swallow., Disp: 12 g, Rfl: 0    lidocaine (Lidoderm) 5 % patch, Place 1 patch over 12 hours on the skin once daily. Apply to painful area 12 hours per day, remove for 12 hours., Disp: 14 patch, Rfl: 0    Allergies   Allergen Reactions    Iodine Other, Anaphylaxis and Hives     Shrimp causes hives, facial swelling    Sulfa (Sulfonamide Antibiotics) Other, Hives, Rash and Swelling     hives, headache   swelling hands, face     hives, headache swelling hands, face    Banana Hives    Cantaloupe Hives    Orange Hives    Strawberry Hives    Aluminum Hives    Doxycycline Unknown     Severe abdominal pain     "Esomeprazole Other     Cluster migraines, constipation, dizziness    Esomeprazole Magnesium Unknown     Cluster migraines, constipation, dizziness    Lactose Hives     lactose intolerant    Levofloxacin Other and Unknown     Leg pain, numbness, tingling    Moxifloxacin Other and Hives     Leg pain, numbness, tingling    Ofloxacin Unknown    Shrimp Unknown    Tomato Other     sores in mouth       ROUTINE:     Immunizations current on all immunizations   Mammogram: last completed 8/20/24  follow[facility] through [facility] for dense breast tissue and fibrocystic breast changes   PAP/GYN EXAM: 5/24/24   COLONOSCOPY: 6/21/2024. 5 mm polyp ( TA) repeat in 5 years   DEXA: per rheumatology Dr. Forte   7/12/22 Lowest T score -2.6 in right Hip  6/9/2020  lowest T score -2.5 in left hip   OPHTHALMOLOGY: 11/21/24  DERMATOLOGY  8/15/24     Functional bowl disorder after sigmoid resection after recurrent bouts of diverticulitis:  either constipation or diarrhea.    OA in both hips and both knees.  Right worse than left.     Review of Systems   Respiratory:  Positive for shortness of breath.    Cardiovascular:  Positive for chest pain (mid sternal chest pain). Negative for palpitations and leg swelling.   Neurological:  Negative for syncope and light-headedness.      Poor sleeper because of her back.        /79 (BP Location: Left arm, Patient Position: Sitting)   Pulse 79   Temp 36.7 °C (98 °F)   Ht 1.613 m (5' 3.5\")   Wt 61.7 kg (136 lb)   SpO2 96%   BMI 23.71 kg/m²  Body mass index is 23.71 kg/m².    Physical Exam  Constitutional:       Appearance: Normal appearance. She is not ill-appearing.   Cardiovascular:      Rate and Rhythm: Normal rate and regular rhythm.      Heart sounds: Murmur (holosystolic heard loudest at apex) heard.   Pulmonary:      Effort: Pulmonary effort is normal.      Breath sounds: Normal breath sounds.   Abdominal:      General: Abdomen is flat. Bowel sounds are normal.      " Palpations: Abdomen is soft.   Musculoskeletal:         General: Normal range of motion.   Skin:     Comments: Right ear with nodule with central scab over the helix of the right ear          RESULTS/DATA:  Reviewed Standard Labs for this physical with patient ( any significant issues addressed in A/P )     ECG: Sinus, left axis deviation, moderate right precordial repolarization disturbance . ECG unchanged from 10/21/22      Assessment/Plan   1. Chronic low back pain without sciatica  Will have her do a trial of a lidocaine patch   - lidocaine (Lidoderm) 5 % patch; Place 1 patch over 12 hours on the skin once daily. Apply to painful area 12 hours per day, remove for 12 hours.  Dispense: 14 patch; Refill: 0    2. Primary osteoarthritis of both hips  right > left     - Referral to Orthopaedic Surgery; Future    -  4. Hearing difficulty of both ears    - Referral to Audiology; Future    5. Murmur, cardiac  Newly appreciated loud systolic murmur   - Transthoracic Echo (TTE) Complete; Future    6. Encounter for Medicare annual wellness exam (Primary)  Done today     7. Annual physical exam  Up to date on her routine screenings and immunizations   - ECG 12 lead (Clinic Performed)  -  obtain fasting labs     8. Reactive airway disease without complication, unspecified asthma severity, unspecified whether persistent ([facility])  Refilled her flovent inhaler. She does have a spacer      fluticasone (Flovent) 220 mcg/actuation inhaler; Inhale 2 puffs 2 times a day. Rinse mouth with water after use to reduce aftertaste and incidence of candidiasis. Do not swallow.  Dispense: 12 g; Refill: 0    9. Osteoporosis, unspecified osteoporosis type, unspecified pathological fracture presence  Needs osteoporosis treatment and updated bone density. She [facility]    10. Functional bowel syndrome with both constipation and diarrhea  Continue lifestyle management, bentyl as needed    11. Multiple pulmonary  nodules  Stable over 2 years.  No further follow up needed      12. Non healing nodule on upper right ear.  Follow up with derm.     Nia Fletcher MD

## 2024-11-22 ENCOUNTER — APPOINTMENT (OUTPATIENT)
Dept: PRIMARY CARE | Facility: CLINIC | Age: 69
End: 2024-11-22
Payer: MEDICARE

## 2024-11-22 VITALS
BODY MASS INDEX: 23.22 KG/M2 | WEIGHT: 136 LBS | HEART RATE: 79 BPM | SYSTOLIC BLOOD PRESSURE: 121 MMHG | HEIGHT: 64 IN | DIASTOLIC BLOOD PRESSURE: 79 MMHG | OXYGEN SATURATION: 96 % | TEMPERATURE: 98 F

## 2024-11-22 DIAGNOSIS — J45.909 REACTIVE AIRWAY DISEASE WITHOUT COMPLICATION, UNSPECIFIED ASTHMA SEVERITY, UNSPECIFIED WHETHER PERSISTENT (HHS-HCC): ICD-10-CM

## 2024-11-22 DIAGNOSIS — Z00.00 ANNUAL PHYSICAL EXAM: ICD-10-CM

## 2024-11-22 DIAGNOSIS — R01.1 MURMUR, CARDIAC: ICD-10-CM

## 2024-11-22 DIAGNOSIS — H91.93 HEARING DIFFICULTY OF BOTH EARS: ICD-10-CM

## 2024-11-22 DIAGNOSIS — M54.50 CHRONIC MIDLINE LOW BACK PAIN WITHOUT SCIATICA: ICD-10-CM

## 2024-11-22 DIAGNOSIS — R91.8 MULTIPLE PULMONARY NODULES: ICD-10-CM

## 2024-11-22 DIAGNOSIS — J40 BRONCHITIS: ICD-10-CM

## 2024-11-22 DIAGNOSIS — M81.0 OSTEOPOROSIS, UNSPECIFIED OSTEOPOROSIS TYPE, UNSPECIFIED PATHOLOGICAL FRACTURE PRESENCE: ICD-10-CM

## 2024-11-22 DIAGNOSIS — M16.0 PRIMARY OSTEOARTHRITIS OF BOTH HIPS: ICD-10-CM

## 2024-11-22 DIAGNOSIS — G89.29 CHRONIC MIDLINE LOW BACK PAIN WITHOUT SCIATICA: ICD-10-CM

## 2024-11-22 DIAGNOSIS — Z00.00 ENCOUNTER FOR MEDICARE ANNUAL WELLNESS EXAM: Primary | ICD-10-CM

## 2024-11-22 DIAGNOSIS — K58.2 IRRITABLE BOWEL SYNDROME WITH BOTH CONSTIPATION AND DIARRHEA: ICD-10-CM

## 2024-11-22 RX ORDER — FLUTICASONE PROPIONATE 220 UG/1
2 AEROSOL, METERED RESPIRATORY (INHALATION)
Qty: 12 G | Refills: 0 | Status: SHIPPED | OUTPATIENT
Start: 2024-11-22 | End: 2024-11-22

## 2024-11-22 RX ORDER — BUDESONIDE 180 UG/1
1 AEROSOL, POWDER RESPIRATORY (INHALATION)
Qty: 180 EACH | Refills: 3 | Status: SHIPPED | OUTPATIENT
Start: 2024-11-22 | End: 2025-11-22

## 2024-11-22 RX ORDER — LIDOCAINE 50 MG/G
1 PATCH TOPICAL DAILY
Qty: 14 PATCH | Refills: 0 | Status: SHIPPED | OUTPATIENT
Start: 2024-11-22 | End: 2025-11-22

## 2024-11-22 ASSESSMENT — ENCOUNTER SYMPTOMS
SHORTNESS OF BREATH: 1
PALPITATIONS: 0
LIGHT-HEADEDNESS: 0

## 2024-11-22 NOTE — PATIENT INSTRUCTIONS
Please Arrange to follow up with your rheumatologist to address your osteoporosis   Will refer you on to Dr. Saxena for discussion on hip replacement .  You have declined updating your xrays today.     I have prescribed a lidocaine patch for your back pain      At your convenience please forward a copy of your healthcare power of  and living will     I am referring you to audiology for hearing evaluation  I am also referring you for an ECHO for the heart murmur

## 2024-11-23 ENCOUNTER — LAB (OUTPATIENT)
Dept: LAB | Facility: LAB | Age: 69
End: 2024-11-23
Payer: MEDICARE

## 2024-11-23 DIAGNOSIS — Z00.00 HEALTHCARE MAINTENANCE: ICD-10-CM

## 2024-11-23 LAB
25(OH)D3 SERPL-MCNC: 39 NG/ML (ref 30–100)
ALBUMIN SERPL BCP-MCNC: 4.2 G/DL (ref 3.4–5)
ALP SERPL-CCNC: 83 U/L (ref 33–136)
ALT SERPL W P-5'-P-CCNC: 12 U/L (ref 7–45)
ANION GAP SERPL CALC-SCNC: 12 MMOL/L (ref 10–20)
APPEARANCE UR: CLEAR
AST SERPL W P-5'-P-CCNC: 16 U/L (ref 9–39)
BASOPHILS # BLD AUTO: 0.07 X10*3/UL (ref 0–0.1)
BASOPHILS NFR BLD AUTO: 1 %
BILIRUB SERPL-MCNC: 1 MG/DL (ref 0–1.2)
BILIRUB UR STRIP.AUTO-MCNC: NEGATIVE MG/DL
BUN SERPL-MCNC: 15 MG/DL (ref 6–23)
CALCIUM SERPL-MCNC: 9.7 MG/DL (ref 8.6–10.6)
CHLORIDE SERPL-SCNC: 105 MMOL/L (ref 98–107)
CHOLEST SERPL-MCNC: 224 MG/DL (ref 0–199)
CHOLESTEROL/HDL RATIO: 2.7
CO2 SERPL-SCNC: 27 MMOL/L (ref 21–32)
COLOR UR: NORMAL
CREAT SERPL-MCNC: 0.87 MG/DL (ref 0.5–1.05)
CRP SERPL HS-MCNC: 2.7 MG/L
EGFRCR SERPLBLD CKD-EPI 2021: 72 ML/MIN/1.73M*2
EOSINOPHIL # BLD AUTO: 0.32 X10*3/UL (ref 0–0.7)
EOSINOPHIL NFR BLD AUTO: 4.4 %
ERYTHROCYTE [DISTWIDTH] IN BLOOD BY AUTOMATED COUNT: 12.7 % (ref 11.5–14.5)
EST. AVERAGE GLUCOSE BLD GHB EST-MCNC: 105 MG/DL
GLUCOSE SERPL-MCNC: 80 MG/DL (ref 74–99)
GLUCOSE UR STRIP.AUTO-MCNC: NORMAL MG/DL
HBA1C MFR BLD: 5.3 %
HCT VFR BLD AUTO: 42.2 % (ref 36–46)
HDLC SERPL-MCNC: 82.3 MG/DL
HGB BLD-MCNC: 13.7 G/DL (ref 12–16)
HOLD SPECIMEN: NORMAL
IMM GRANULOCYTES # BLD AUTO: 0.01 X10*3/UL (ref 0–0.7)
IMM GRANULOCYTES NFR BLD AUTO: 0.1 % (ref 0–0.9)
KETONES UR STRIP.AUTO-MCNC: NEGATIVE MG/DL
LDLC SERPL CALC-MCNC: 127 MG/DL
LEUKOCYTE ESTERASE UR QL STRIP.AUTO: NEGATIVE
LYMPHOCYTES # BLD AUTO: 2.02 X10*3/UL (ref 1.2–4.8)
LYMPHOCYTES NFR BLD AUTO: 27.7 %
MCH RBC QN AUTO: 30.1 PG (ref 26–34)
MCHC RBC AUTO-ENTMCNC: 32.5 G/DL (ref 32–36)
MCV RBC AUTO: 93 FL (ref 80–100)
MONOCYTES # BLD AUTO: 0.58 X10*3/UL (ref 0.1–1)
MONOCYTES NFR BLD AUTO: 7.9 %
NEUTROPHILS # BLD AUTO: 4.3 X10*3/UL (ref 1.2–7.7)
NEUTROPHILS NFR BLD AUTO: 58.9 %
NITRITE UR QL STRIP.AUTO: NEGATIVE
NON HDL CHOLESTEROL: 142 MG/DL (ref 0–149)
NRBC BLD-RTO: 0 /100 WBCS (ref 0–0)
PH UR STRIP.AUTO: 6.5 [PH]
PLATELET # BLD AUTO: 227 X10*3/UL (ref 150–450)
POTASSIUM SERPL-SCNC: 4.8 MMOL/L (ref 3.5–5.3)
PROT SERPL-MCNC: 6.6 G/DL (ref 6.4–8.2)
PROT UR STRIP.AUTO-MCNC: NEGATIVE MG/DL
RBC # BLD AUTO: 4.55 X10*6/UL (ref 4–5.2)
RBC # UR STRIP.AUTO: NEGATIVE /UL
SODIUM SERPL-SCNC: 139 MMOL/L (ref 136–145)
SP GR UR STRIP.AUTO: 1.02
TRIGL SERPL-MCNC: 73 MG/DL (ref 0–149)
TSH SERPL-ACNC: 1.38 MIU/L (ref 0.44–3.98)
UROBILINOGEN UR STRIP.AUTO-MCNC: NORMAL MG/DL
VLDL: 15 MG/DL (ref 0–40)
WBC # BLD AUTO: 7.3 X10*3/UL (ref 4.4–11.3)

## 2024-11-23 PROCEDURE — 80053 COMPREHEN METABOLIC PANEL: CPT

## 2024-11-23 PROCEDURE — 84443 ASSAY THYROID STIM HORMONE: CPT

## 2024-11-23 PROCEDURE — 82306 VITAMIN D 25 HYDROXY: CPT | Mod: WAIVER OF LIABILITY ON FILE

## 2024-11-23 PROCEDURE — 81003 URINALYSIS AUTO W/O SCOPE: CPT

## 2024-11-23 PROCEDURE — 86141 C-REACTIVE PROTEIN HS: CPT | Mod: WAIVER OF LIABILITY ON FILE

## 2024-11-23 PROCEDURE — 36415 COLL VENOUS BLD VENIPUNCTURE: CPT

## 2024-11-23 PROCEDURE — 85025 COMPLETE CBC W/AUTO DIFF WBC: CPT | Mod: WAIVER OF LIABILITY ON FILE

## 2024-11-23 PROCEDURE — 80061 LIPID PANEL: CPT

## 2024-11-23 PROCEDURE — 83036 HEMOGLOBIN GLYCOSYLATED A1C: CPT

## 2024-11-26 ENCOUNTER — APPOINTMENT (OUTPATIENT)
Dept: PRIMARY CARE | Facility: CLINIC | Age: 69
End: 2024-11-26
Payer: MEDICARE

## 2024-12-06 ENCOUNTER — APPOINTMENT (OUTPATIENT)
Dept: CARDIOLOGY | Facility: CLINIC | Age: 69
End: 2024-12-06
Payer: MEDICARE

## 2024-12-07 ENCOUNTER — TELEPHONE (OUTPATIENT)
Dept: PRIMARY CARE | Facility: CLINIC | Age: 69
End: 2024-12-07
Payer: MEDICARE

## 2024-12-07 DIAGNOSIS — J32.9 SINUSITIS, UNSPECIFIED CHRONICITY, UNSPECIFIED LOCATION: Primary | ICD-10-CM

## 2024-12-07 RX ORDER — HYDROCODONE BITARTRATE AND HOMATROPINE METHYLBROMIDE ORAL SOLUTION 5; 1.5 MG/5ML; MG/5ML
5 LIQUID ORAL EVERY 6 HOURS PRN
Qty: 100 ML | Refills: 0 | Status: SHIPPED | OUTPATIENT
Start: 2024-12-07 | End: 2024-12-12

## 2024-12-07 RX ORDER — AMOXICILLIN AND CLAVULANATE POTASSIUM 400; 57 MG/5ML; MG/5ML
875 POWDER, FOR SUSPENSION ORAL EVERY 12 HOURS SCHEDULED
Qty: 152.6 ML | Refills: 0 | Status: SHIPPED | OUTPATIENT
Start: 2024-12-07 | End: 2024-12-14

## 2024-12-07 RX ORDER — AMOXICILLIN AND CLAVULANATE POTASSIUM 875; 125 MG/1; MG/1
875 TABLET, FILM COATED ORAL 2 TIMES DAILY
Qty: 14 TABLET | Refills: 0 | Status: SHIPPED | OUTPATIENT
Start: 2024-12-07 | End: 2024-12-07 | Stop reason: ENTERED-IN-ERROR

## 2024-12-07 NOTE — TELEPHONE ENCOUNTER
Couple weeks of URI like symptoms.  Coughing worse as night  Copious Nasal congestion and drainage.   1 1/2  weeks ago started with a sore throat.  Symptoms are persistent and changing   Will call in Hycodan and Augmentin

## 2024-12-13 ENCOUNTER — HOSPITAL ENCOUNTER (OUTPATIENT)
Dept: CARDIOLOGY | Facility: CLINIC | Age: 69
Discharge: HOME | End: 2024-12-13
Payer: MEDICARE

## 2024-12-13 DIAGNOSIS — R01.1 MURMUR, CARDIAC: ICD-10-CM

## 2024-12-13 LAB
AORTIC VALVE PEAK VELOCITY: 1.55 M/S
AV PEAK GRADIENT: 10 MMHG
AVA (PEAK VEL): 1.7 CM2
EJECTION FRACTION APICAL 4 CHAMBER: 79.4
EJECTION FRACTION: 76 %
LEFT ATRIUM VOLUME AREA LENGTH INDEX BSA: 92.8 ML/M2
LEFT VENTRICLE INTERNAL DIMENSION DIASTOLE: 5.79 CM (ref 3.5–6)
LEFT VENTRICULAR OUTFLOW TRACT DIAMETER: 1.83 CM
MITRAL VALVE E/A RATIO: 1.72
RIGHT VENTRICLE FREE WALL PEAK S': 14 CM/S
RIGHT VENTRICLE PEAK SYSTOLIC PRESSURE: 44 MMHG
TRICUSPID ANNULAR PLANE SYSTOLIC EXCURSION: 2.7 CM

## 2024-12-13 PROCEDURE — 93306 TTE W/DOPPLER COMPLETE: CPT

## 2024-12-13 PROCEDURE — 93306 TTE W/DOPPLER COMPLETE: CPT | Performed by: INTERNAL MEDICINE

## 2025-01-31 ENCOUNTER — PROCEDURE VISIT (OUTPATIENT)
Dept: GASTROENTEROLOGY | Facility: CLINIC | Age: 70
End: 2025-01-31
Payer: MEDICARE

## 2025-01-31 DIAGNOSIS — R19.7 DIARRHEA, UNSPECIFIED TYPE: ICD-10-CM

## 2025-01-31 DIAGNOSIS — R14.0 BLOATING: ICD-10-CM

## 2025-01-31 PROCEDURE — 91065 BREATH HYDROGEN/METHANE TEST: CPT | Performed by: NURSE PRACTITIONER

## 2025-01-31 PROCEDURE — 91065 BREATH HYDROGEN/METHANE TEST: CPT

## 2025-01-31 NOTE — RESULT ENCOUNTER NOTE
Richie Kelsey,  Looks like Nessa's test was positive for SIBO. She was having a lot of bloating and diarrhea  Do you recommend xifaxan ?  Any tricks to get it approved by insurance ?

## 2025-01-31 NOTE — PROGRESS NOTES
Patient ID: Nessa Valle is a 69 y.o. female.    Breath Hydrogen Test-Bacterial Overgrowth    Date/Time: 1/31/2025 11:10 AM    Performed by: Doreen Miller MA  Authorized by: Nia Fletcher MD    Consent:     Consent obtained:  Verbal    Consent given by:  Patient  Universal protocol:     Procedure explained and questions answered to patient or proxy's satisfaction: yes      Relevant documents present and verified: yes      Test results available: yes      Patient identity confirmed:  Verbally with patient  Sedation:     Sedation type:  None  Anesthesia:     Anesthesia method:  None  Post-procedure details:     Procedure completion:  Tolerated  Hydrogen Breath Analysis Consultation Sheet    Referring Provider: Nia Fletcher MD  [id number]   [facility], [place] [address]    Indication: R/O SIBO    Symptoms: Diarrhea, unspecified type (R19.7)    Age: 69 y.o.  Weight: There were no vitals filed for this visit.  Substrate: Glucose   Dose: 75 grams    Last Meal: 01/30/2025 1800  Recent Antibiotics: Denies    RESULTS:   Time PPM (H2) APPM* (CH4) CO2 Correction   Baseline #1 0856 2 0 3.4 1.61   Baseline #2 0858 2 0 3.1 1.77   *Challenge Dose Sugar: 0900  15' 0915 5 2 3.0 1.83   30' 0930 9 1 2.9 1.89   45' 0945 24 6 3.4 1.61   60' 1000 30 9 3.1 1.77   75' 1015 63 12 3.3 1.66   90' 1030 19 4 3.1 1.77   105' 1045 8 0 3.5 1.57   120' 1100 11 2 3.1 1.77   135'        150'        165'        180'          Impression: Positive for SIBO    
0

## 2025-02-03 DIAGNOSIS — K63.8219 SMALL INTESTINAL BACTERIAL OVERGROWTH (SIBO): ICD-10-CM

## 2025-02-03 DIAGNOSIS — K58.0 IRRITABLE BOWEL SYNDROME WITH DIARRHEA: Primary | ICD-10-CM

## 2025-03-28 ENCOUNTER — TELEPHONE (OUTPATIENT)
Dept: PRIMARY CARE | Facility: CLINIC | Age: 70
End: 2025-03-28
Payer: MEDICARE

## 2025-03-28 NOTE — TELEPHONE ENCOUNTER
Patient woke up last night having chest pains and sob, could lay flat.  She has taken Advil with a little help.    Please advise  [phone]

## 2025-03-28 NOTE — TELEPHONE ENCOUNTER
"Called with complaints of chest pain.  Started at 2 am.  Woke up from a sound sleep with \"horrific pain\" between her breasts.  Couldn't lie down.   Had to bring legs up and couldn't breath deeply \"because it hurt to breath\"  She otherwise feels well and sounds fine on the phone.  No fever or chills, no n/v.  No radiation of pain into arms or jaw. Not diaphoretic.  Pain with breathing in.  Started at 2 am and around 6 am she took 2 advil and was able to fall back asleep. I is still there but \"not bad\".   A 5/10 now.   Returned from [place] on Monday. No calf swelling or leg pain.   She is not short of breath but it hurts to take a deep breath.    Pulse ox is 98 and her HR is 130.  She does have a history of SVT. History of fast HR and Heart murmur.  Not lightheaded and was offered ablation in the past which she has declined because she is mostly asymptomatic with the svt. She is on verapamil for this because a BB didn't work. She coincidentally has a visit with a cardiologist on Monday for follow up for abnormal ECHO results with mod. Mitral valve disease.  We discussed that this sounds more like pleurisy- patient has a history of autoimmune disorders.  I did let her know I think it would be unlikely this is a heart attack or a blood clot however PE is in the differential.  I advised ibuprofen and tylenol and see how she does over the next 24 hours.  She is going to let me know if any of her symptoms worsen or she develops any new symptoms.   "

## 2025-07-03 ENCOUNTER — TELEPHONE (OUTPATIENT)
Dept: PRIMARY CARE | Facility: CLINIC | Age: 70
End: 2025-07-03

## 2025-07-03 ENCOUNTER — LAB (OUTPATIENT)
Dept: LAB | Facility: HOSPITAL | Age: 70
End: 2025-07-03
Payer: MEDICARE

## 2025-07-03 DIAGNOSIS — R30.9 PAIN WITH URINATION: ICD-10-CM

## 2025-07-03 RX ORDER — CEPHALEXIN 500 MG/1
500 CAPSULE ORAL 3 TIMES DAILY
Qty: 21 CAPSULE | Refills: 0 | Status: SHIPPED | OUTPATIENT
Start: 2025-07-03 | End: 2025-07-10

## 2025-07-03 NOTE — TELEPHONE ENCOUNTER
Patient is having UTI symptoms and would like to get urine checked.  Sx- pain with urinating, pressure, odor.    She had 2 open heart surgeries in May.   She has a allergy to amoxicillin.    Please advise if you order meds to treat symptoms after she leaves specimen.     Order placed for urine

## 2025-07-06 DIAGNOSIS — N30.00 ACUTE CYSTITIS WITHOUT HEMATURIA: Primary | ICD-10-CM

## 2025-07-06 DIAGNOSIS — N39.0 URINARY TRACT INFECTION WITHOUT HEMATURIA, SITE UNSPECIFIED: Primary | ICD-10-CM

## 2025-07-06 LAB
APPEARANCE UR: CLEAR
BACTERIA #/AREA URNS HPF: ABNORMAL /HPF
BACTERIA UR CULT: ABNORMAL
BACTERIA UR CULT: ABNORMAL
BILIRUB UR QL STRIP: NEGATIVE
COLOR UR: YELLOW
GLUCOSE UR QL STRIP: NEGATIVE
HGB UR QL STRIP: ABNORMAL
HYALINE CASTS #/AREA URNS LPF: ABNORMAL /LPF
KETONES UR QL STRIP: NEGATIVE
LEUKOCYTE ESTERASE UR QL STRIP: ABNORMAL
NITRITE UR QL STRIP: NEGATIVE
PH UR STRIP: 6.5 [PH] (ref 5–8)
PROT UR QL STRIP: NEGATIVE
RBC #/AREA URNS HPF: ABNORMAL /HPF
SERVICE CMNT-IMP: ABNORMAL
SP GR UR STRIP: 1.01 (ref 1–1.03)
SQUAMOUS #/AREA URNS HPF: ABNORMAL /HPF
WBC #/AREA URNS HPF: ABNORMAL /HPF

## 2025-07-06 RX ORDER — CIPROFLOXACIN 500 MG/1
500 TABLET, FILM COATED ORAL 2 TIMES DAILY
Qty: 10 TABLET | Refills: 0 | Status: SHIPPED | OUTPATIENT
Start: 2025-07-06 | End: 2025-07-06 | Stop reason: ENTERED-IN-ERROR

## 2025-07-06 RX ORDER — AMOXICILLIN AND CLAVULANATE POTASSIUM 875; 125 MG/1; MG/1
875 TABLET, FILM COATED ORAL 2 TIMES DAILY
Qty: 14 TABLET | Refills: 0 | Status: SHIPPED | OUTPATIENT
Start: 2025-07-06 | End: 2025-07-13

## 2025-07-07 DIAGNOSIS — N39.0 URINARY TRACT INFECTION WITHOUT HEMATURIA, SITE UNSPECIFIED: Primary | ICD-10-CM

## 2025-07-07 DIAGNOSIS — N30.00 ACUTE CYSTITIS WITHOUT HEMATURIA: Primary | ICD-10-CM

## 2025-07-07 RX ORDER — AMOXICILLIN 400 MG/5ML
875 POWDER, FOR SUSPENSION ORAL 2 TIMES DAILY
Qty: 152.6 ML | Refills: 0 | Status: SHIPPED | OUTPATIENT
Start: 2025-07-07 | End: 2025-07-14

## 2025-07-07 RX ORDER — AMOXICILLIN AND CLAVULANATE POTASSIUM 400; 57 MG/5ML; MG/5ML
875 POWDER, FOR SUSPENSION ORAL 2 TIMES DAILY
Qty: 152.6 ML | Refills: 0 | Status: SHIPPED | OUTPATIENT
Start: 2025-07-07 | End: 2025-07-14

## 2025-07-24 ENCOUNTER — TELEPHONE (OUTPATIENT)
Dept: PRIMARY CARE | Facility: CLINIC | Age: 70
End: 2025-07-24
Payer: MEDICARE

## 2025-07-24 DIAGNOSIS — R30.9 PAIN WITH URINATION: ICD-10-CM

## 2025-07-24 NOTE — TELEPHONE ENCOUNTER
Patient doesn't feel like UTI is gone.  She is still having lots of pain with urination, pressure, no odor.    She would like to get urine checked and possibly start medication.    Please advise  [phone]    Order placed

## 2025-07-25 LAB
APPEARANCE UR: CLEAR
BACTERIA #/AREA URNS HPF: NORMAL /HPF
BACTERIA UR CULT: NORMAL
BILIRUB UR QL STRIP: NEGATIVE
COLOR UR: YELLOW
GLUCOSE UR QL STRIP: NEGATIVE
HGB UR QL STRIP: NEGATIVE
HYALINE CASTS #/AREA URNS LPF: NORMAL /LPF
KETONES UR QL STRIP: NEGATIVE
LEUKOCYTE ESTERASE UR QL STRIP: NEGATIVE
NITRITE UR QL STRIP: NEGATIVE
PH UR STRIP: 5.5 [PH] (ref 5–8)
PROT UR QL STRIP: NEGATIVE
RBC #/AREA URNS HPF: NORMAL /HPF
SERVICE CMNT-IMP: NORMAL
SP GR UR STRIP: 1.01 (ref 1–1.03)
SQUAMOUS #/AREA URNS HPF: NORMAL /HPF
WBC #/AREA URNS HPF: NORMAL /HPF

## 2025-08-15 ENCOUNTER — TELEPHONE (OUTPATIENT)
Dept: PRIMARY CARE | Facility: CLINIC | Age: 70
End: 2025-08-15
Payer: MEDICARE

## 2025-08-15 DIAGNOSIS — R11.0 NAUSEA: Primary | ICD-10-CM

## 2025-08-15 RX ORDER — ONDANSETRON 4 MG/1
4 TABLET, ORALLY DISINTEGRATING ORAL EVERY 8 HOURS PRN
Qty: 20 TABLET | Refills: 0 | Status: SHIPPED | OUTPATIENT
Start: 2025-08-15 | End: 2025-08-22